# Patient Record
Sex: FEMALE | Race: BLACK OR AFRICAN AMERICAN | Employment: FULL TIME | URBAN - METROPOLITAN AREA
[De-identification: names, ages, dates, MRNs, and addresses within clinical notes are randomized per-mention and may not be internally consistent; named-entity substitution may affect disease eponyms.]

---

## 2020-08-19 ENCOUNTER — HOSPITAL ENCOUNTER (EMERGENCY)
Age: 45
Discharge: HOME | End: 2020-08-19
Attending: EMERGENCY MEDICINE
Payer: COMMERCIAL

## 2020-08-19 VITALS
SYSTOLIC BLOOD PRESSURE: 108 MMHG | TEMPERATURE: 97.8 F | RESPIRATION RATE: 14 BRPM | OXYGEN SATURATION: 100 % | WEIGHT: 275.8 LBS | HEART RATE: 94 BPM | DIASTOLIC BLOOD PRESSURE: 77 MMHG

## 2020-08-19 LAB
A/G RATIO: 1.3 (ref 1.1–2.2)
ALBUMIN SERPL-MCNC: 4.9 G/DL (ref 3.4–5)
ALP BLD-CCNC: 201 U/L (ref 40–129)
ALT SERPL-CCNC: 119 U/L (ref 10–40)
ANION GAP SERPL CALCULATED.3IONS-SCNC: 17 MMOL/L (ref 3–16)
AST SERPL-CCNC: 95 U/L (ref 15–37)
BACTERIA: ABNORMAL /HPF
BASOPHILS ABSOLUTE: 0.1 K/UL (ref 0–0.2)
BASOPHILS RELATIVE PERCENT: 0.5 %
BILIRUB SERPL-MCNC: 0.5 MG/DL (ref 0–1)
BILIRUBIN URINE: NEGATIVE
BLOOD, URINE: ABNORMAL
BUN BLDV-MCNC: 14 MG/DL (ref 7–20)
CALCIUM SERPL-MCNC: 10 MG/DL (ref 8.3–10.6)
CHLORIDE BLD-SCNC: 93 MMOL/L (ref 99–110)
CLARITY: CLEAR
CO2: 26 MMOL/L (ref 21–32)
COLOR: ABNORMAL
CREAT SERPL-MCNC: 0.9 MG/DL (ref 0.6–1.1)
EOSINOPHILS ABSOLUTE: 0.2 K/UL (ref 0–0.6)
EOSINOPHILS RELATIVE PERCENT: 1.4 %
EPITHELIAL CELLS, UA: ABNORMAL /HPF (ref 0–5)
GFR AFRICAN AMERICAN: >60
GFR NON-AFRICAN AMERICAN: >60
GLOBULIN: 3.8 G/DL
GLUCOSE BLD-MCNC: 412 MG/DL (ref 70–99)
GLUCOSE BLD-MCNC: 413 MG/DL
GLUCOSE BLD-MCNC: 413 MG/DL (ref 70–99)
GLUCOSE BLD-MCNC: 466 MG/DL (ref 70–99)
GLUCOSE BLD-MCNC: 636 MG/DL (ref 70–99)
GLUCOSE URINE: >=1000 MG/DL
HCG(URINE) PREGNANCY TEST: NEGATIVE
HCT VFR BLD CALC: 38.1 % (ref 36–48)
HEMOGLOBIN: 12.3 G/DL (ref 12–16)
KETONES, URINE: NEGATIVE MG/DL
LEUKOCYTE ESTERASE, URINE: NEGATIVE
LYMPHOCYTES ABSOLUTE: 2.9 K/UL (ref 1–5.1)
LYMPHOCYTES RELATIVE PERCENT: 26.6 %
MCH RBC QN AUTO: 23.4 PG (ref 26–34)
MCHC RBC AUTO-ENTMCNC: 32.3 G/DL (ref 31–36)
MCV RBC AUTO: 72.5 FL (ref 80–100)
MICROSCOPIC EXAMINATION: YES
MONOCYTES ABSOLUTE: 0.5 K/UL (ref 0–1.3)
MONOCYTES RELATIVE PERCENT: 4.4 %
NEUTROPHILS ABSOLUTE: 7.2 K/UL (ref 1.7–7.7)
NEUTROPHILS RELATIVE PERCENT: 67.1 %
NITRITE, URINE: NEGATIVE
PDW BLD-RTO: 18.3 % (ref 12.4–15.4)
PERFORMED ON: ABNORMAL
PH UA: 6 (ref 5–8)
PLATELET # BLD: 344 K/UL (ref 135–450)
PMV BLD AUTO: 9.9 FL (ref 5–10.5)
POTASSIUM REFLEX MAGNESIUM: 4.4 MMOL/L (ref 3.5–5.1)
PROTEIN UA: NEGATIVE MG/DL
RBC # BLD: 5.26 M/UL (ref 4–5.2)
RBC UA: ABNORMAL /HPF (ref 0–4)
SODIUM BLD-SCNC: 136 MMOL/L (ref 136–145)
SPECIFIC GRAVITY UA: <=1.005 (ref 1–1.03)
TOTAL PROTEIN: 8.7 G/DL (ref 6.4–8.2)
URINE TYPE: ABNORMAL
UROBILINOGEN, URINE: 0.2 E.U./DL
WBC # BLD: 10.7 K/UL (ref 4–11)
WBC UA: ABNORMAL /HPF (ref 0–5)
YEAST: PRESENT /HPF

## 2020-08-19 PROCEDURE — 6370000000 HC RX 637 (ALT 250 FOR IP): Performed by: EMERGENCY MEDICINE

## 2020-08-19 PROCEDURE — 99284 EMERGENCY DEPT VISIT MOD MDM: CPT

## 2020-08-19 PROCEDURE — 80053 COMPREHEN METABOLIC PANEL: CPT

## 2020-08-19 PROCEDURE — 6370000000 HC RX 637 (ALT 250 FOR IP)

## 2020-08-19 PROCEDURE — 96372 THER/PROPH/DIAG INJ SC/IM: CPT

## 2020-08-19 PROCEDURE — 85025 COMPLETE CBC W/AUTO DIFF WBC: CPT

## 2020-08-19 PROCEDURE — 84703 CHORIONIC GONADOTROPIN ASSAY: CPT

## 2020-08-19 PROCEDURE — 81001 URINALYSIS AUTO W/SCOPE: CPT

## 2020-08-19 RX ORDER — BLOOD-GLUCOSE METER
1 KIT MISCELLANEOUS DAILY
Qty: 1 KIT | Refills: 0 | Status: SHIPPED | OUTPATIENT
Start: 2020-08-19 | End: 2021-08-10 | Stop reason: SDUPTHER

## 2020-08-19 RX ORDER — GLUCOSAMINE HCL/CHONDROITIN SU 500-400 MG
CAPSULE ORAL
Qty: 90 STRIP | Refills: 0 | Status: SHIPPED | OUTPATIENT
Start: 2020-08-19

## 2020-08-19 RX ADMIN — INSULIN HUMAN 13 UNITS: 100 INJECTION, SOLUTION PARENTERAL at 16:29

## 2020-08-19 RX ADMIN — INSULIN HUMAN 13 UNITS: 100 INJECTION, SOLUTION PARENTERAL at 15:12

## 2020-08-19 RX ADMIN — INSULIN HUMAN 13 UNITS: 100 INJECTION, SOLUTION PARENTERAL at 17:48

## 2020-08-19 NOTE — ED PROVIDER NOTES
EMERGENCY DEPARTMENT PHYSICIAN DOCUMENTATION      CHIEF COMPLAINT  dehydration    Patient information was obtained from patient. History/Exam limitations: none. HISTORY OF PRESENT ILLNESS  Gage Salmeron is a 45 y.o. female with complaint of \"dehydration. \"    States 1 month history of polyuria and polydipsia. Mouth feels dry like metal.  She is drinking over a gallon of water a day and still feels thirsty. She feels overall weak and lightheaded and low energy. She does have a history of heavy menstrual periods. No chest pain or shortness of breath. REVIEW OF SYSTEMS  A full 10 point Review of Systems was performed and is negative aside from pertinent positives mentioned in HPI    ALLERGIES:  No Known Allergies    PAST HISTORY  No past medical history on file. No family history on file. No current facility-administered medications on file prior to encounter.       Current Outpatient Medications on File Prior to Encounter   Medication Sig Dispense Refill    ibuprofen (ADVIL;MOTRIN) 800 MG tablet Take 1 tablet by mouth every 8 hours as needed for Pain or Fever 20 tablet 0       Social History     Tobacco Use    Smoking status: Never Smoker   Substance Use Topics    Alcohol use: No    Drug use: No         EXAM:   Presentation Vital Signs: /71   Pulse 109   Temp 97.8 °F (36.6 °C) (Oral)   Resp 17   Wt 275 lb 12.8 oz (125.1 kg)   SpO2 97%     General: Well nourished, no acute distress  Head: No traumatic injury  ENT: mm dry, no facial asymmetry, no nasal discharge  Eyes: EOM  Neck: No tracheal deviation or stridor  Lungs: Respirations clear to ausculation, no respiratory distress  Cardiac:borderline tachy, no murmurs or gallops  Abdomen: Soft, mild ruq tender  Skin: no pallor, erythema, lesions or other abnormalities on exposed skin of face and arms  Extremities: Normal ROM of bilateral upper extremities at shoulders, elbows, wrists; normal ROM of bilateral LE at hips and

## 2020-08-19 NOTE — ED NOTES
Patient working on 3rd large cup of water Dr James Ang bedside.      Diego Ingram, SALBADOR  08/19/20 0503

## 2020-08-19 NOTE — ED NOTES
Pt verbalized understanding of discharge instructions and the need for keeping a record of FSBS for her [facility]     Daniella Beltran RN  08/19/20 1921

## 2020-08-19 NOTE — ED NOTES
Patient to ed with complaints of feeling lightheaded, dry mouth and increased urination which has been present for over 1 month, patient reports her blood sugar was high at work.      Shari Juan RN  08/19/20 0000

## 2020-08-20 ENCOUNTER — OFFICE VISIT (OUTPATIENT)
Dept: FAMILY MEDICINE CLINIC | Age: 45
End: 2020-08-20
Payer: COMMERCIAL

## 2020-08-20 ENCOUNTER — HOSPITAL ENCOUNTER (OUTPATIENT)
Age: 45
Setting detail: OBSERVATION
Discharge: HOME | End: 2020-08-21
Attending: EMERGENCY MEDICINE | Admitting: INTERNAL MEDICINE
Payer: COMMERCIAL

## 2020-08-20 VITALS
TEMPERATURE: 97.7 F | WEIGHT: 280 LBS | HEART RATE: 107 BPM | DIASTOLIC BLOOD PRESSURE: 70 MMHG | HEIGHT: 68 IN | BODY MASS INDEX: 42.44 KG/M2 | OXYGEN SATURATION: 97 % | SYSTOLIC BLOOD PRESSURE: 102 MMHG

## 2020-08-20 PROBLEM — E11.9 DIABETES MELLITUS (HCC): Status: ACTIVE | Noted: 2020-08-20

## 2020-08-20 PROBLEM — E11.9 NEW ONSET TYPE 2 DIABETES MELLITUS (HCC): Status: ACTIVE | Noted: 2020-08-20

## 2020-08-20 LAB
ANION GAP SERPL CALCULATED.3IONS-SCNC: 11 MMOL/L (ref 3–16)
ANION GAP SERPL CALCULATED.3IONS-SCNC: 16 MMOL/L (ref 3–16)
BASE EXCESS VENOUS: -1.2 MMOL/L (ref -2–3)
BASOPHILS ABSOLUTE: 0.1 K/UL (ref 0–0.2)
BASOPHILS RELATIVE PERCENT: 0.9 %
BILIRUBIN URINE: NEGATIVE
BLOOD, URINE: NEGATIVE
BUN BLDV-MCNC: 12 MG/DL (ref 7–20)
BUN BLDV-MCNC: 13 MG/DL (ref 7–20)
CALCIUM SERPL-MCNC: 8.9 MG/DL (ref 8.3–10.6)
CALCIUM SERPL-MCNC: 9 MG/DL (ref 8.3–10.6)
CARBOXYHEMOGLOBIN: 1.8 % (ref 0–1.5)
CHLORIDE BLD-SCNC: 93 MMOL/L (ref 99–110)
CHLORIDE BLD-SCNC: 96 MMOL/L (ref 99–110)
CHOLESTEROL, TOTAL: 184 MG/DL (ref 0–199)
CLARITY: CLEAR
CO2: 21 MMOL/L (ref 21–32)
CO2: 23 MMOL/L (ref 21–32)
COLOR: YELLOW
CREAT SERPL-MCNC: 0.9 MG/DL (ref 0.6–1.1)
CREAT SERPL-MCNC: 1 MG/DL (ref 0.6–1.1)
EOSINOPHILS ABSOLUTE: 0.2 K/UL (ref 0–0.6)
EOSINOPHILS RELATIVE PERCENT: 1.4 %
GFR AFRICAN AMERICAN: >60
GFR AFRICAN AMERICAN: >60
GFR NON-AFRICAN AMERICAN: >60
GFR NON-AFRICAN AMERICAN: >60
GLUCOSE BLD-MCNC: 354 MG/DL (ref 70–99)
GLUCOSE BLD-MCNC: 369 MG/DL (ref 70–99)
GLUCOSE BLD-MCNC: 374 MG/DL (ref 70–99)
GLUCOSE BLD-MCNC: 423 MG/DL (ref 70–99)
GLUCOSE BLD-MCNC: 433 MG/DL (ref 70–99)
GLUCOSE BLD-MCNC: 465 MG/DL (ref 70–99)
GLUCOSE URINE: >=1000 MG/DL
HCG(URINE) PREGNANCY TEST: NEGATIVE
HCO3 VENOUS: 23.9 MMOL/L (ref 24–28)
HCT VFR BLD CALC: 36.3 % (ref 36–48)
HDLC SERPL-MCNC: 39 MG/DL (ref 40–60)
HEMOGLOBIN, VEN, REDUCED: 36 %
HEMOGLOBIN: 11.8 G/DL (ref 12–16)
KETONES, URINE: >=80 MG/DL
LDL CHOLESTEROL CALCULATED: ABNORMAL MG/DL
LDL CHOLESTEROL DIRECT: 91 MG/DL
LEUKOCYTE ESTERASE, URINE: NEGATIVE
LYMPHOCYTES ABSOLUTE: 2.8 K/UL (ref 1–5.1)
LYMPHOCYTES RELATIVE PERCENT: 23.8 %
MCH RBC QN AUTO: 23.6 PG (ref 26–34)
MCHC RBC AUTO-ENTMCNC: 32.6 G/DL (ref 31–36)
MCV RBC AUTO: 72.3 FL (ref 80–100)
METHEMOGLOBIN VENOUS: 0.8 % (ref 0–1.5)
MICROSCOPIC EXAMINATION: ABNORMAL
MONOCYTES ABSOLUTE: 0.5 K/UL (ref 0–1.3)
MONOCYTES RELATIVE PERCENT: 4.3 %
NEUTROPHILS ABSOLUTE: 8.3 K/UL (ref 1.7–7.7)
NEUTROPHILS RELATIVE PERCENT: 69.6 %
NITRITE, URINE: NEGATIVE
O2 SAT, VEN: 63 %
PCO2, VEN: 44.4 MMHG (ref 41–51)
PDW BLD-RTO: 18 % (ref 12.4–15.4)
PERFORMED ON: ABNORMAL
PH UA: 6 (ref 5–8)
PH VENOUS: 7.35 (ref 7.35–7.45)
PLATELET # BLD: 294 K/UL (ref 135–450)
PMV BLD AUTO: 9.9 FL (ref 5–10.5)
PO2, VEN: 39.6 MMHG (ref 25–40)
POTASSIUM REFLEX MAGNESIUM: 4.3 MMOL/L (ref 3.5–5.1)
POTASSIUM REFLEX MAGNESIUM: 5.7 MMOL/L (ref 3.5–5.1)
POTASSIUM SERPL-SCNC: 5.4 MMOL/L (ref 3.5–5.1)
PROTEIN UA: NEGATIVE MG/DL
RBC # BLD: 5.02 M/UL (ref 4–5.2)
SODIUM BLD-SCNC: 130 MMOL/L (ref 136–145)
SODIUM BLD-SCNC: 130 MMOL/L (ref 136–145)
SPECIFIC GRAVITY UA: 1.01 (ref 1–1.03)
TCO2 CALC VENOUS: 25 MMOL/L
TRIGL SERPL-MCNC: 401 MG/DL (ref 0–150)
URINE TYPE: ABNORMAL
UROBILINOGEN, URINE: 0.2 E.U./DL
VLDLC SERPL CALC-MCNC: ABNORMAL MG/DL
WBC # BLD: 11.9 K/UL (ref 4–11)

## 2020-08-20 PROCEDURE — 99204 OFFICE O/P NEW MOD 45 MIN: CPT | Performed by: NURSE PRACTITIONER

## 2020-08-20 PROCEDURE — 6360000002 HC RX W HCPCS: Performed by: STUDENT IN AN ORGANIZED HEALTH CARE EDUCATION/TRAINING PROGRAM

## 2020-08-20 PROCEDURE — G0378 HOSPITAL OBSERVATION PER HR: HCPCS

## 2020-08-20 PROCEDURE — 99285 EMERGENCY DEPT VISIT HI MDM: CPT

## 2020-08-20 PROCEDURE — 96372 THER/PROPH/DIAG INJ SC/IM: CPT

## 2020-08-20 PROCEDURE — 2580000003 HC RX 258: Performed by: STUDENT IN AN ORGANIZED HEALTH CARE EDUCATION/TRAINING PROGRAM

## 2020-08-20 PROCEDURE — 85025 COMPLETE CBC W/AUTO DIFF WBC: CPT

## 2020-08-20 PROCEDURE — 80048 BASIC METABOLIC PNL TOTAL CA: CPT

## 2020-08-20 PROCEDURE — 80061 LIPID PANEL: CPT

## 2020-08-20 PROCEDURE — 6370000000 HC RX 637 (ALT 250 FOR IP): Performed by: STUDENT IN AN ORGANIZED HEALTH CARE EDUCATION/TRAINING PROGRAM

## 2020-08-20 PROCEDURE — 2580000003 HC RX 258: Performed by: EMERGENCY MEDICINE

## 2020-08-20 PROCEDURE — 84703 CHORIONIC GONADOTROPIN ASSAY: CPT

## 2020-08-20 PROCEDURE — 36415 COLL VENOUS BLD VENIPUNCTURE: CPT

## 2020-08-20 PROCEDURE — 81003 URINALYSIS AUTO W/O SCOPE: CPT

## 2020-08-20 PROCEDURE — 83721 ASSAY OF BLOOD LIPOPROTEIN: CPT

## 2020-08-20 PROCEDURE — 82803 BLOOD GASES ANY COMBINATION: CPT

## 2020-08-20 PROCEDURE — 84132 ASSAY OF SERUM POTASSIUM: CPT

## 2020-08-20 PROCEDURE — 1111F DSCHRG MED/CURRENT MED MERGE: CPT | Performed by: NURSE PRACTITIONER

## 2020-08-20 RX ORDER — SODIUM CHLORIDE, SODIUM LACTATE, POTASSIUM CHLORIDE, CALCIUM CHLORIDE 600; 310; 30; 20 MG/100ML; MG/100ML; MG/100ML; MG/100ML
INJECTION, SOLUTION INTRAVENOUS CONTINUOUS
Status: DISCONTINUED | OUTPATIENT
Start: 2020-08-20 | End: 2020-08-20 | Stop reason: SDUPTHER

## 2020-08-20 RX ORDER — SODIUM CHLORIDE 0.9 % (FLUSH) 0.9 %
10 SYRINGE (ML) INJECTION PRN
Status: DISCONTINUED | OUTPATIENT
Start: 2020-08-20 | End: 2020-08-21 | Stop reason: HOSPADM

## 2020-08-20 RX ORDER — DEXTROSE MONOHYDRATE 50 MG/ML
100 INJECTION, SOLUTION INTRAVENOUS PRN
Status: DISCONTINUED | OUTPATIENT
Start: 2020-08-20 | End: 2020-08-20 | Stop reason: SDUPTHER

## 2020-08-20 RX ORDER — INSULIN LISPRO 100 [IU]/ML
8 INJECTION, SOLUTION INTRAVENOUS; SUBCUTANEOUS ONCE
Status: COMPLETED | OUTPATIENT
Start: 2020-08-20 | End: 2020-08-20

## 2020-08-20 RX ORDER — NICOTINE POLACRILEX 4 MG
15 LOZENGE BUCCAL PRN
Status: DISCONTINUED | OUTPATIENT
Start: 2020-08-20 | End: 2020-08-20 | Stop reason: SDUPTHER

## 2020-08-20 RX ORDER — DEXTROSE MONOHYDRATE 50 MG/ML
100 INJECTION, SOLUTION INTRAVENOUS PRN
Status: DISCONTINUED | OUTPATIENT
Start: 2020-08-20 | End: 2020-08-21 | Stop reason: HOSPADM

## 2020-08-20 RX ORDER — ACETAMINOPHEN 650 MG/1
650 SUPPOSITORY RECTAL EVERY 6 HOURS PRN
Status: DISCONTINUED | OUTPATIENT
Start: 2020-08-20 | End: 2020-08-21 | Stop reason: HOSPADM

## 2020-08-20 RX ORDER — INSULIN LISPRO 100 [IU]/ML
0-3 INJECTION, SOLUTION INTRAVENOUS; SUBCUTANEOUS NIGHTLY
Status: DISCONTINUED | OUTPATIENT
Start: 2020-08-20 | End: 2020-08-21

## 2020-08-20 RX ORDER — INSULIN GLARGINE 100 [IU]/ML
30 INJECTION, SOLUTION SUBCUTANEOUS NIGHTLY
Qty: 15 PEN | Refills: 1 | Status: CANCELLED | OUTPATIENT
Start: 2020-08-20

## 2020-08-20 RX ORDER — SODIUM CHLORIDE, SODIUM LACTATE, POTASSIUM CHLORIDE, CALCIUM CHLORIDE 600; 310; 30; 20 MG/100ML; MG/100ML; MG/100ML; MG/100ML
INJECTION, SOLUTION INTRAVENOUS CONTINUOUS
Status: DISCONTINUED | OUTPATIENT
Start: 2020-08-20 | End: 2020-08-21 | Stop reason: HOSPADM

## 2020-08-20 RX ORDER — DEXTROSE MONOHYDRATE 25 G/50ML
12.5 INJECTION, SOLUTION INTRAVENOUS PRN
Status: DISCONTINUED | OUTPATIENT
Start: 2020-08-20 | End: 2020-08-21 | Stop reason: HOSPADM

## 2020-08-20 RX ORDER — ACETAMINOPHEN 325 MG/1
650 TABLET ORAL EVERY 6 HOURS PRN
Status: DISCONTINUED | OUTPATIENT
Start: 2020-08-20 | End: 2020-08-21 | Stop reason: HOSPADM

## 2020-08-20 RX ORDER — INSULIN LISPRO 100 [IU]/ML
8 INJECTION, SOLUTION INTRAVENOUS; SUBCUTANEOUS
Qty: 3 PEN | Refills: 2 | Status: CANCELLED | OUTPATIENT
Start: 2020-08-20

## 2020-08-20 RX ORDER — POLYETHYLENE GLYCOL 3350 17 G/17G
17 POWDER, FOR SOLUTION ORAL DAILY PRN
Status: DISCONTINUED | OUTPATIENT
Start: 2020-08-20 | End: 2020-08-21 | Stop reason: HOSPADM

## 2020-08-20 RX ORDER — INSULIN LISPRO 100 [IU]/ML
0-6 INJECTION, SOLUTION INTRAVENOUS; SUBCUTANEOUS
Status: DISCONTINUED | OUTPATIENT
Start: 2020-08-20 | End: 2020-08-21

## 2020-08-20 RX ORDER — ONDANSETRON 2 MG/ML
4 INJECTION INTRAMUSCULAR; INTRAVENOUS EVERY 6 HOURS PRN
Status: DISCONTINUED | OUTPATIENT
Start: 2020-08-20 | End: 2020-08-21 | Stop reason: HOSPADM

## 2020-08-20 RX ORDER — SODIUM CHLORIDE, SODIUM LACTATE, POTASSIUM CHLORIDE, AND CALCIUM CHLORIDE .6; .31; .03; .02 G/100ML; G/100ML; G/100ML; G/100ML
1000 INJECTION, SOLUTION INTRAVENOUS ONCE
Status: COMPLETED | OUTPATIENT
Start: 2020-08-20 | End: 2020-08-20

## 2020-08-20 RX ORDER — SODIUM CHLORIDE 0.9 % (FLUSH) 0.9 %
10 SYRINGE (ML) INJECTION EVERY 12 HOURS SCHEDULED
Status: DISCONTINUED | OUTPATIENT
Start: 2020-08-20 | End: 2020-08-21 | Stop reason: HOSPADM

## 2020-08-20 RX ORDER — PROMETHAZINE HYDROCHLORIDE 12.5 MG/1
12.5 TABLET ORAL EVERY 6 HOURS PRN
Status: DISCONTINUED | OUTPATIENT
Start: 2020-08-20 | End: 2020-08-21 | Stop reason: HOSPADM

## 2020-08-20 RX ORDER — DEXTROSE MONOHYDRATE 25 G/50ML
12.5 INJECTION, SOLUTION INTRAVENOUS PRN
Status: DISCONTINUED | OUTPATIENT
Start: 2020-08-20 | End: 2020-08-20 | Stop reason: SDUPTHER

## 2020-08-20 RX ORDER — NICOTINE POLACRILEX 4 MG
15 LOZENGE BUCCAL PRN
Status: DISCONTINUED | OUTPATIENT
Start: 2020-08-20 | End: 2020-08-21 | Stop reason: HOSPADM

## 2020-08-20 RX ADMIN — INSULIN GLARGINE 5 UNITS: 100 INJECTION, SOLUTION SUBCUTANEOUS at 21:15

## 2020-08-20 RX ADMIN — SODIUM CHLORIDE, POTASSIUM CHLORIDE, SODIUM LACTATE AND CALCIUM CHLORIDE: 600; 310; 30; 20 INJECTION, SOLUTION INTRAVENOUS at 18:38

## 2020-08-20 RX ADMIN — ENOXAPARIN SODIUM 40 MG: 40 INJECTION SUBCUTANEOUS at 19:11

## 2020-08-20 RX ADMIN — INSULIN HUMAN 5 UNITS: 100 INJECTION, SOLUTION PARENTERAL at 19:11

## 2020-08-20 RX ADMIN — SODIUM CHLORIDE, POTASSIUM CHLORIDE, SODIUM LACTATE AND CALCIUM CHLORIDE 1000 ML: 600; 310; 30; 20 INJECTION, SOLUTION INTRAVENOUS at 14:52

## 2020-08-20 RX ADMIN — INSULIN LISPRO 8 UNITS: 100 INJECTION, SOLUTION INTRAVENOUS; SUBCUTANEOUS at 21:15

## 2020-08-20 SDOH — ECONOMIC STABILITY: INCOME INSECURITY: HOW HARD IS IT FOR YOU TO PAY FOR THE VERY BASICS LIKE FOOD, HOUSING, MEDICAL CARE, AND HEATING?: NOT HARD AT ALL

## 2020-08-20 SDOH — ECONOMIC STABILITY: TRANSPORTATION INSECURITY
IN THE PAST 12 MONTHS, HAS LACK OF TRANSPORTATION KEPT YOU FROM MEETINGS, WORK, OR FROM GETTING THINGS NEEDED FOR DAILY LIVING?: NO

## 2020-08-20 SDOH — ECONOMIC STABILITY: FOOD INSECURITY: WITHIN THE PAST 12 MONTHS, YOU WORRIED THAT YOUR FOOD WOULD RUN OUT BEFORE YOU GOT MONEY TO BUY MORE.: NEVER TRUE

## 2020-08-20 SDOH — ECONOMIC STABILITY: FOOD INSECURITY: WITHIN THE PAST 12 MONTHS, THE FOOD YOU BOUGHT JUST DIDN'T LAST AND YOU DIDN'T HAVE MONEY TO GET MORE.: NEVER TRUE

## 2020-08-20 SDOH — ECONOMIC STABILITY: TRANSPORTATION INSECURITY
IN THE PAST 12 MONTHS, HAS THE LACK OF TRANSPORTATION KEPT YOU FROM MEDICAL APPOINTMENTS OR FROM GETTING MEDICATIONS?: NO

## 2020-08-20 ASSESSMENT — PAIN DESCRIPTION - FREQUENCY: FREQUENCY: INTERMITTENT

## 2020-08-20 ASSESSMENT — ENCOUNTER SYMPTOMS
RESPIRATORY NEGATIVE: 1
VOMITING: 0
NAUSEA: 1
FACIAL SWELLING: 0
DIARRHEA: 1
COLOR CHANGE: 0
SHORTNESS OF BREATH: 0
ABDOMINAL DISTENTION: 0
NAUSEA: 1
COUGH: 0

## 2020-08-20 ASSESSMENT — PAIN - FUNCTIONAL ASSESSMENT: PAIN_FUNCTIONAL_ASSESSMENT: ACTIVITIES ARE NOT PREVENTED

## 2020-08-20 ASSESSMENT — PAIN DESCRIPTION - DESCRIPTORS: DESCRIPTORS: ACHING

## 2020-08-20 ASSESSMENT — PAIN SCALES - GENERAL
PAINLEVEL_OUTOF10: 0
PAINLEVEL_OUTOF10: 5

## 2020-08-20 ASSESSMENT — PAIN DESCRIPTION - ONSET: ONSET: ON-GOING

## 2020-08-20 ASSESSMENT — PATIENT HEALTH QUESTIONNAIRE - PHQ9
SUM OF ALL RESPONSES TO PHQ9 QUESTIONS 1 & 2: 0
2. FEELING DOWN, DEPRESSED OR HOPELESS: 0
1. LITTLE INTEREST OR PLEASURE IN DOING THINGS: 0
SUM OF ALL RESPONSES TO PHQ QUESTIONS 1-9: 0
SUM OF ALL RESPONSES TO PHQ QUESTIONS 1-9: 0

## 2020-08-20 ASSESSMENT — PAIN DESCRIPTION - LOCATION: LOCATION: HEAD

## 2020-08-20 ASSESSMENT — PAIN DESCRIPTION - PAIN TYPE: TYPE: ACUTE PAIN

## 2020-08-20 ASSESSMENT — PAIN DESCRIPTION - PROGRESSION: CLINICAL_PROGRESSION: GRADUALLY WORSENING

## 2020-08-20 ASSESSMENT — PAIN DESCRIPTION - ORIENTATION: ORIENTATION: MID

## 2020-08-20 NOTE — PROGRESS NOTES
2020    Adelaida Garcia (:  1975) is a 45 y.o. female, here to establish care or for evaluation of the following medical concerns:    Patient is a newly diagnosed type II diabetic. Seen in the emergency room yesterday with a blood sugar of 686. Apparently she refused to get IV fluids and was discharged on metformin. Blood sugar in office today was 436 however patient is feeling very lightheaded, dizzy, nauseous. Review of Systems   Constitutional: Positive for activity change, appetite change and fatigue. Eyes: Positive for visual disturbance. Respiratory: Negative. Cardiovascular: Negative. Gastrointestinal: Positive for nausea. Genitourinary: Positive for frequency. Musculoskeletal: Negative. Neurological: Positive for dizziness and light-headedness. Psychiatric/Behavioral: Negative. Current Outpatient Medications   Medication Sig Dispense Refill    metFORMIN (GLUCOPHAGE) 500 MG tablet Take 1 tablet by mouth 2 times daily (with meals) 60 tablet 1    glucose monitoring kit (FREESTYLE) monitoring kit 1 kit by Does not apply route daily 1 kit 0    blood glucose monitor strips Test 3 times a day & as needed for symptoms of irregular blood glucose. Dispense sufficient amount for indicated testing frequency plus additional to accommodate PRN testing needs. 90 strip 0    ibuprofen (ADVIL;MOTRIN) 800 MG tablet Take 1 tablet by mouth every 8 hours as needed for Pain or Fever 20 tablet 0     No current facility-administered medications for this visit. No Known Allergies    History reviewed. No pertinent past medical history.     Past Surgical History:   Procedure Laterality Date    APPENDECTOMY         Social History     Socioeconomic History    Marital status: Single     Spouse name: Not on file    Number of children: Not on file    Years of education: Not on file    Highest education level: Not on file   Occupational History    Not on file   Social Needs  Financial resource strain: Not hard at all   [employer] insecurity     Worry: Never true     Inability: Never true    Transportation needs     Medical: No     Non-medical: No   Tobacco Use    Smoking status: Never Smoker    Smokeless tobacco: Never Used   Substance and Sexual Activity    Alcohol use: No    Drug use: No    Sexual activity: Yes     Partners: Male   Lifestyle    Physical activity     Days per week: Not on file     Minutes per session: Not on file    Stress: Not on file   Relationships    Social connections     Talks on phone: Not on file     Gets together: Not on file     Attends Jehovah's witness service: Not on file     Active member of club or organization: Not on file     Attends meetings of clubs or organizations: Not on file     Relationship status: Not on file    Intimate partner violence     Fear of current or ex partner: Not on file     Emotionally abused: Not on file     Physically abused: Not on file     Forced sexual activity: Not on file   Other Topics Concern    Not on file   Social History Narrative    Not on file        Family History   Problem Relation Age of Onset    Asthma Mother     Diabetes Mother     Heart Disease Mother        Vitals:    08/20/20 1253   BP: 102/70   Pulse: 107   Temp: 97.7 °F (36.5 °C)   SpO2: 97%       Estimated body mass index is 42.83 kg/m² as calculated from the following:    Height as of this encounter: 5' 7.8\" (1.722 m). Weight as of this encounter: 280 lb (127 kg). Physical Exam  Constitutional:       Appearance: She is well-developed. HENT:      Head: Normocephalic. Eyes:      Pupils: Pupils are equal, round, and reactive to light. Neck:      Musculoskeletal: Normal range of motion. Cardiovascular:      Rate and Rhythm: Normal rate. Pulmonary:      Effort: Pulmonary effort is normal.   Musculoskeletal: Normal range of motion. Skin:     General: Skin is warm and dry.    Neurological:      Mental Status: She is alert and oriented to

## 2020-08-20 NOTE — PLAN OF CARE
4 Eyes Admission Assessment     I agree as the admission nurse that 2 RN's have performed a thorough Head to Toe Skin Assessment on the patient. ALL assessment sites listed below have been assessed on admission. Areas assessed by both nurses: Radha Kobs  [x]   Head, Face, and Ears   [x]   Shoulders, Back, and Chest  [x]   Arms, Elbows, and Hands   [x]   Coccyx, Sacrum, and Ischum  [x]   Legs, Feet, and Heels        Does the Patient have Skin Breakdown?   No         Rauliot Prevention initiated:  NA   Wound Care Orders initiated:  No      [facility] nurse consulted for Pressure Injury (Stage 3,4, Unstageable, DTI, NWPT, and Complex wounds):  No      Nurse 1 eSignature: Electronically signed by Sadia Suarez RN on 8/20/20 at 7:03 PM EDT    **SHARE this note so that the co-signing nurse is able to place an eSignature**    Nurse 2 eSignature: Electronically signed by Kathy Durand RN on 8/20/20 at 7:45 PM EDT

## 2020-08-20 NOTE — PROGRESS NOTES
Patient arrived to floor at 31 [phone] 62. Patient A&O x4, vitals stable, blood glucose 354, not on telemetry, no shortness of breath, no chest pain. Patient's bed locked in lowest position. Lactated ringers infusing in right AC at 150 ml/hr. Patient's questions answered and needs met.

## 2020-08-20 NOTE — H&P
Internal Medicine  PGY 1  History & Physical      CC blurry vision, nausea    History Obtained From:  Patient, mother at bedside    HISTORY OF PRESENT ILLNESS:   Deysi Banuelos is a 45 y.o. female with no significant PMHx who presents with nausea, blurry vision, dizziness, polyuria, and polydipsia which began about a month ago . She presented to ED yesterday with same symptoms and was found to be hyperglycemic into the 600 range, treated with fluids, 39 units of insulin, and advised to be admitted, but refused admission and followed up today with family medicine. She was prescribed 500mg metformin BID. ED labs significant for glucose 436, urine glucose >1000, ketonuria >80, potassium 5.7, no anion gap.     Denies recent falls, chest pain, palpitations, fevers, or shortness of breath. Past Medical History:    · History reviewed. No pertinent past medical history. Past Surgical History:        Procedure Laterality Date    APPENDECTOMY     ·     Medications Priorto Admission:    · Not in a hospital admission. Allergies:  Patient has no known allergies. Social History:   · TOBACCO:   reports that she has never smoked. She has never used smokeless tobacco.  · ETOH:   reports no history of alcohol use. · Patient currently lives with family   ·   Family History:       Problem Relation Age of Onset    Asthma Mother     Diabetes Mother     Heart Disease Mother    ·     Review of Systems   Constitutional: Positive for fatigue. Negative for diaphoresis and fever. HENT: Negative for congestion and facial swelling. Eyes: Positive for visual disturbance. Respiratory: Negative for cough and shortness of breath. Cardiovascular: Negative for chest pain and leg swelling. Gastrointestinal: Positive for diarrhea and nausea. Negative for abdominal distention and vomiting. Endocrine: Positive for polydipsia and polyuria. Genitourinary: Negative for dysuria and flank pain.    Musculoskeletal: Negative last 72 hours. INR: No results for input(s): INR in the last 72 hours. U/A:  Recent Labs     08/19/20  1352 08/20/20  1442   COLORU Straw Yellow   PHUR 6.0 6.0   WBCUA None seen  --    RBCUA 5-10*  --    YEAST Present*  --    BACTERIA Rare*  --    CLARITYU Clear Clear   SPECGRAV <=1.005 1.010   LEUKOCYTESUR Negative Negative   UROBILINOGEN 0.2 0.2   BILIRUBINUR Negative Negative   BLOODU MODERATE* Negative   GLUCOSEU >=1000* >=1000*       No orders to display           ASSESSMENT AND PLAN:  Ms. Sharon Fiore is a 44 yo woman with no significant PMHx presenting with polyuria, polydipsia, diplopia, and nausea, found to be hyperglycemic in 400's with ketonuria. Diabetes Type 2, new diagnosis; H1ac at 9  Hyperglycemia  Hyperkalemia  No anion gap, glucosuria >1000, urine ketones >80  5 Lantus and LDSS  IV fluids  microalbumin and urine Cr  Glucose checks qhACS  Hypoglycemia protocols  Diabetes education    Hyperkalemia  -insulin  -follow up RFP and Mg, replete as necessary    Leukocytosis  -likely reactive; no other signs of infection  -will continue to monitor    Obesity-  Complicates all aspects of care; Will discuss health risks of obesity and  lifestyle modifications   -lipid panel      Will discuss with attending physician Dr. Vasu Hernandez Status:Full code  FEN: carb control  PPX: Lovenox  DISPO: Daryl Simon MD  8/20/2020,  5:41 PM    ________________________________________________________________________     Attending Attestation  Patient seen and examined independently but in conjunction with resident physician. We discussed the resident's workup, evaluation, management and diagnosis. I agree with the residents care plan except as noted.      My assessment reveals morbidly obese AAF presenting w/ new onset DM. Admitted as there is concern that pt may go into DKA w/o acute mgmt. Started on metformin yd. As above.      PHYSICAL EXAM PERFORMED:     /80   Pulse 100   Temp 98 °F (36.7 °C)

## 2020-08-20 NOTE — ED PROVIDER NOTES
36.0 g/dL    RDW 18.0 (H) 12.4 - 15.4 %    Platelets 722 054 - 812 K/uL    MPV 9.9 5.0 - 10.5 fL    Neutrophils % 69.6 %    Lymphocytes % 23.8 %    Monocytes % 4.3 %    Eosinophils % 1.4 %    Basophils % 0.9 %    Neutrophils Absolute 8.3 (H) 1.7 - 7.7 K/uL    Lymphocytes Absolute 2.8 1.0 - 5.1 K/uL    Monocytes Absolute 0.5 0.0 - 1.3 K/uL    Eosinophils Absolute 0.2 0.0 - 0.6 K/uL    Basophils Absolute 0.1 0.0 - 0.2 K/uL   Basic Metabolic Panel w/ Reflex to MG   Result Value Ref Range    Sodium 130 (L) 136 - 145 mmol/L    Potassium reflex Magnesium 5.7 (H) 3.5 - 5.1 mmol/L    Chloride 93 (L) 99 - 110 mmol/L    CO2 21 21 - 32 mmol/L    Anion Gap 16 3 - 16    Glucose 423 (H) 70 - 99 mg/dL    BUN 13 7 - 20 mg/dL    CREATININE 1.0 0.6 - 1.1 mg/dL    GFR Non-African American >60 >60    GFR African American >60 >60    Calcium 9.0 8.3 - 10.6 mg/dL   Blood gas, venous (Lab)   Result Value Ref Range    pH, Arthur 7.351 7.350 - 7.450    pCO2, Arthur 44.4 41.0 - 51.0 mmHg    pO2, Arthur 39.6 25.0 - 40.0 mmHg    HCO3, Venous 23.9 (L) 24.0 - 28.0 mmol/L    Base Excess, Arthur -1.2 -2.0 - 3.0 mmol/L    O2 Sat, Arthur 63 Not established %    Carboxyhemoglobin 1.8 (H) 0.0 - 1.5 %    MetHgb, Arthur 0.8 0.0 - 1.5 %    TC02 (Calc), Arthur 25 mmol/L    Hemoglobin, Arthur, Reduced 36.00 %   Urinalysis   Result Value Ref Range    Color, UA Yellow Straw/Yellow    Clarity, UA Clear Clear    Glucose, Ur >=1000 (A) Negative mg/dL    Bilirubin Urine Negative Negative    Ketones, Urine >=80 (A) Negative mg/dL    Specific Gravity, UA 1.010 1.005 - 1.030    Blood, Urine Negative Negative    pH, UA 6.0 5.0 - 8.0    Protein, UA Negative Negative mg/dL    Urobilinogen, Urine 0.2 <2.0 E.U./dL    Nitrite, Urine Negative Negative    Leukocyte Esterase, Urine Negative Negative    Microscopic Examination Not Indicated     Urine Type NotGiven    Pregnancy, urine   Result Value Ref Range    HCG(Urine) Pregnancy Test Negative Detects HCG level >20 MIU/mL   Potassium (Lab) Result Value Ref Range    Potassium 5.4 (H) 3.5 - 5.1 mmol/L   POCT Glucose   Result Value Ref Range    POC Glucose 465 (H) 70 - 99 mg/dl    Performed on ACCU-CHEK    POCT Glucose   Result Value Ref Range    POC Glucose 369 (H) 70 - 99 mg/dl    Performed on ACCU-CHEK    POCT Glucose   Result Value Ref Range    POC Glucose 354 (H) 70 - 99 mg/dl    Performed on ACCU-CHEK    POCT Glucose   Result Value Ref Range    POC Glucose 433 (H) 70 - 99 mg/dl    Performed on ACCU-CHEK        ED BEDSIDE ULTRASOUND:  None    RECENT VITALS:  BP: 114/77,Temp: 98.3 °F (36.8 °C), Pulse: 95, Resp: 16, SpO2: 96 %     Procedures     None    ED Course     Nursing Notes, Past Medical Hx, Past Surgical Hx, Social Hx,Allergies, and Family Hx were reviewed.     patient was given the following medications:  Orders Placed This Encounter   Medications    lactated ringers bolus    sodium chloride flush 0.9 % injection 10 mL    sodium chloride flush 0.9 % injection 10 mL    OR Linked Order Group     acetaminophen (TYLENOL) tablet 650 mg     acetaminophen (TYLENOL) suppository 650 mg    polyethylene glycol (GLYCOLAX) packet 17 g    OR Linked Order Group     promethazine (PHENERGAN) tablet 12.5 mg     ondansetron (ZOFRAN) injection 4 mg    enoxaparin (LOVENOX) injection 40 mg    DISCONTD: glucose (GLUTOSE) 40 % oral gel 15 g    DISCONTD: dextrose 50 % IV solution    DISCONTD: glucagon (rDNA) injection 1 mg    DISCONTD: dextrose 5 % solution    insulin glargine (LANTUS;BASAGLAR) injection pen 5 Units    insulin lispro (1 Unit Dial) 0-6 Units    insulin lispro (1 Unit Dial) 0-3 Units    DISCONTD: lactated ringers infusion    insulin regular (HUMULIN R;NOVOLIN R) injection 5 Units    glucose (GLUTOSE) 40 % oral gel 15 g    dextrose 50 % IV solution    glucagon (rDNA) injection 1 mg    dextrose 5 % solution    lactated ringers infusion    insulin lispro (1 Unit Dial) 8 Units       CONSULTS:  IP CONSULT TO HOSPITALIST  IP CONSULT TO DIABETES EDUCATOR  IP CONSULT TO SOCIAL WORK    MEDICAL DECISIONMAKING / ASSESSMENT / Efren Zamzam is a 45 y.o. female newly diagnosed with diabetes presenting with hyperglycemia. ED Course as of Aug 20 2240   Thu Aug 20, 2020   1438 On initial encounter patient is afebrile, hemodynamically stable, and in no acute distress. Physical exam is nonfocal.  Plan at this point is to check labs to rule out complications of hyperglycemia including DKA. Hydrate, recheck glucose, insulin, likely admission. [JH]   1602 Potassium was 5.7 on the initial metabolic panel with evidence of hemolysis. Unfortunately, repeat potassium also hemolyzed with potassium 5.4. Patient has no stigmata of DKA on her work-up at this point. After IV fluids patient's glucose is 369 by fingerstick. Concern given that patient is newly diagnosed with diabetes she will need medication titration and education and that admission will facilitate this in the safest fashion. Patient was agreeable to the plan for admission. I contacted the hospitalist and the patient was accepted and she was monitored in the ED closely until she was moved to her new treatment location. [JH]      ED Course User Index  [JH] Karrie Ring MD       Clinical Impression     1.  Hyperglycemia        Disposition     PATIENT REFERRED TO:  BARBY [facility]i - AMBER Phillips  [address] [place] [address] [address]. [facility]  [phone]            DISCHARGE MEDICATIONS:  Current Discharge Medication List          DISPOSITION  Admitted          Karrie Ring MD  08/20/20 224

## 2020-08-20 NOTE — LETTER
[address] [facility] [address]  Phone: [phone]             August 21, 2020    Patient: Greg Paulson   YOB: 1975   Date of Visit: 8/20/2020       To Whom It May Concern:    Austen Adam was seen and treated in our facility  beginning 8/20/2020 until 8/51/20. She may return to work on 8/24/20.       Sincerely,       Hung Morrison RN         Signature:__________________________________

## 2020-08-21 VITALS
DIASTOLIC BLOOD PRESSURE: 83 MMHG | SYSTOLIC BLOOD PRESSURE: 127 MMHG | TEMPERATURE: 97.6 F | BODY MASS INDEX: 42.86 KG/M2 | OXYGEN SATURATION: 97 % | HEART RATE: 83 BPM | RESPIRATION RATE: 18 BRPM | HEIGHT: 68 IN | WEIGHT: 282.8 LBS

## 2020-08-21 LAB
ANION GAP SERPL CALCULATED.3IONS-SCNC: 11 MMOL/L (ref 3–16)
BASOPHILS ABSOLUTE: 0 K/UL (ref 0–0.2)
BASOPHILS RELATIVE PERCENT: 0.3 %
BUN BLDV-MCNC: 10 MG/DL (ref 7–20)
CALCIUM SERPL-MCNC: 8.9 MG/DL (ref 8.3–10.6)
CHLORIDE BLD-SCNC: 98 MMOL/L (ref 99–110)
CO2: 25 MMOL/L (ref 21–32)
CREAT SERPL-MCNC: 0.9 MG/DL (ref 0.6–1.1)
EOSINOPHILS ABSOLUTE: 0.2 K/UL (ref 0–0.6)
EOSINOPHILS RELATIVE PERCENT: 2.2 %
GFR AFRICAN AMERICAN: >60
GFR NON-AFRICAN AMERICAN: >60
GLUCOSE BLD-MCNC: 288 MG/DL (ref 70–99)
GLUCOSE BLD-MCNC: 297 MG/DL (ref 70–99)
GLUCOSE BLD-MCNC: 320 MG/DL (ref 70–99)
GLUCOSE BLD-MCNC: 321 MG/DL (ref 70–99)
GLUCOSE BLD-MCNC: 352 MG/DL (ref 70–99)
HCT VFR BLD CALC: 32.8 % (ref 36–48)
HEMOGLOBIN: 10.6 G/DL (ref 12–16)
LYMPHOCYTES ABSOLUTE: 3.1 K/UL (ref 1–5.1)
LYMPHOCYTES RELATIVE PERCENT: 36.3 %
MCH RBC QN AUTO: 23.4 PG (ref 26–34)
MCHC RBC AUTO-ENTMCNC: 32.3 G/DL (ref 31–36)
MCV RBC AUTO: 72.4 FL (ref 80–100)
MONOCYTES ABSOLUTE: 0.5 K/UL (ref 0–1.3)
MONOCYTES RELATIVE PERCENT: 6 %
NEUTROPHILS ABSOLUTE: 4.8 K/UL (ref 1.7–7.7)
NEUTROPHILS RELATIVE PERCENT: 55.2 %
PDW BLD-RTO: 17.9 % (ref 12.4–15.4)
PERFORMED ON: ABNORMAL
PLATELET # BLD: 259 K/UL (ref 135–450)
PMV BLD AUTO: 9.9 FL (ref 5–10.5)
POTASSIUM REFLEX MAGNESIUM: 4.2 MMOL/L (ref 3.5–5.1)
RBC # BLD: 4.53 M/UL (ref 4–5.2)
SODIUM BLD-SCNC: 134 MMOL/L (ref 136–145)
WBC # BLD: 8.6 K/UL (ref 4–11)

## 2020-08-21 PROCEDURE — 96372 THER/PROPH/DIAG INJ SC/IM: CPT

## 2020-08-21 PROCEDURE — 6370000000 HC RX 637 (ALT 250 FOR IP): Performed by: STUDENT IN AN ORGANIZED HEALTH CARE EDUCATION/TRAINING PROGRAM

## 2020-08-21 PROCEDURE — 80048 BASIC METABOLIC PNL TOTAL CA: CPT

## 2020-08-21 PROCEDURE — 36415 COLL VENOUS BLD VENIPUNCTURE: CPT

## 2020-08-21 PROCEDURE — 2580000003 HC RX 258: Performed by: STUDENT IN AN ORGANIZED HEALTH CARE EDUCATION/TRAINING PROGRAM

## 2020-08-21 PROCEDURE — G0378 HOSPITAL OBSERVATION PER HR: HCPCS

## 2020-08-21 PROCEDURE — 85025 COMPLETE CBC W/AUTO DIFF WBC: CPT

## 2020-08-21 PROCEDURE — 6360000002 HC RX W HCPCS: Performed by: STUDENT IN AN ORGANIZED HEALTH CARE EDUCATION/TRAINING PROGRAM

## 2020-08-21 RX ORDER — GLUCOSAMINE HCL/CHONDROITIN SU 500-400 MG
CAPSULE ORAL
Qty: 100 STRIP | Refills: 0 | Status: SHIPPED | OUTPATIENT
Start: 2020-08-21

## 2020-08-21 RX ORDER — INSULIN LISPRO 100 [IU]/ML
0-12 INJECTION, SOLUTION INTRAVENOUS; SUBCUTANEOUS
Status: DISCONTINUED | OUTPATIENT
Start: 2020-08-21 | End: 2020-08-21 | Stop reason: HOSPADM

## 2020-08-21 RX ORDER — GLIPIZIDE AND METFORMIN HCL 2.5; 5 MG/1; MG/1
1 TABLET, FILM COATED ORAL
Qty: 60 TABLET | Refills: 3 | Status: SHIPPED | OUTPATIENT
Start: 2020-08-21 | End: 2020-08-21 | Stop reason: SDUPTHER

## 2020-08-21 RX ORDER — INSULIN LISPRO 100 [IU]/ML
3 INJECTION, SOLUTION INTRAVENOUS; SUBCUTANEOUS
Status: DISCONTINUED | OUTPATIENT
Start: 2020-08-21 | End: 2020-08-21 | Stop reason: HOSPADM

## 2020-08-21 RX ORDER — LANCETS 30 GAUGE
1 EACH MISCELLANEOUS 2 TIMES DAILY
Qty: 300 EACH | Refills: 1 | Status: SHIPPED | OUTPATIENT
Start: 2020-08-21

## 2020-08-21 RX ORDER — INSULIN LISPRO 100 [IU]/ML
6 INJECTION, SOLUTION INTRAVENOUS; SUBCUTANEOUS ONCE
Status: COMPLETED | OUTPATIENT
Start: 2020-08-21 | End: 2020-08-21

## 2020-08-21 RX ORDER — INSULIN LISPRO 100 [IU]/ML
0-6 INJECTION, SOLUTION INTRAVENOUS; SUBCUTANEOUS NIGHTLY
Status: DISCONTINUED | OUTPATIENT
Start: 2020-08-21 | End: 2020-08-21 | Stop reason: HOSPADM

## 2020-08-21 RX ORDER — GLIPIZIDE AND METFORMIN HCL 2.5; 5 MG/1; MG/1
1 TABLET, FILM COATED ORAL
Qty: 60 TABLET | Refills: 3 | Status: ON HOLD | OUTPATIENT
Start: 2020-08-21 | End: 2021-03-09 | Stop reason: HOSPADM

## 2020-08-21 RX ORDER — GLUCOSAMINE HCL/CHONDROITIN SU 500-400 MG
CAPSULE ORAL
Qty: 100 STRIP | Refills: 0 | Status: SHIPPED | OUTPATIENT
Start: 2020-08-21 | End: 2021-08-10 | Stop reason: SDUPTHER

## 2020-08-21 RX ADMIN — INSULIN LISPRO 3 UNITS: 100 INJECTION, SOLUTION INTRAVENOUS; SUBCUTANEOUS at 08:05

## 2020-08-21 RX ADMIN — SODIUM CHLORIDE, POTASSIUM CHLORIDE, SODIUM LACTATE AND CALCIUM CHLORIDE: 600; 310; 30; 20 INJECTION, SOLUTION INTRAVENOUS at 01:30

## 2020-08-21 RX ADMIN — INSULIN LISPRO 3 UNITS: 100 INJECTION, SOLUTION INTRAVENOUS; SUBCUTANEOUS at 12:19

## 2020-08-21 RX ADMIN — INSULIN LISPRO 10 UNITS: 100 INJECTION, SOLUTION INTRAVENOUS; SUBCUTANEOUS at 12:20

## 2020-08-21 RX ADMIN — ENOXAPARIN SODIUM 40 MG: 40 INJECTION SUBCUTANEOUS at 09:06

## 2020-08-21 RX ADMIN — SODIUM CHLORIDE, POTASSIUM CHLORIDE, SODIUM LACTATE AND CALCIUM CHLORIDE: 600; 310; 30; 20 INJECTION, SOLUTION INTRAVENOUS at 08:17

## 2020-08-21 RX ADMIN — INSULIN LISPRO 6 UNITS: 100 INJECTION, SOLUTION INTRAVENOUS; SUBCUTANEOUS at 01:31

## 2020-08-21 RX ADMIN — Medication 10 ML: at 08:35

## 2020-08-21 ASSESSMENT — PAIN SCALES - GENERAL
PAINLEVEL_OUTOF10: 0
PAINLEVEL_OUTOF10: 0

## 2020-08-21 NOTE — DISCHARGE INSTR - COC
Continuity of Care Form    Patient Name: Emily Quintero   :  1975  MRN:  [id number]    Admit date:  2020  Discharge date:  ***    Code Status Order: Full Code   Advance Directives:   Advance Care Flowsheet Documentation     Date/Time Healthcare Directive Type of Healthcare Directive Copy in [address] Agent's Name Healthcare Agent's Phone Number    20 1831  No, patient does not have an advance directive for healthcare treatment -- -- -- -- --          Admitting Physician:  Nadia Holliday MD  PCP: BARBY Clifton - CNP    Discharging Nurse: [facility] Unit/Room#: 3982/6666-86  Discharging Unit Phone Number: ***    Emergency Contact:   Extended Emergency Contact Information  Primary Emergency Contact: [address] [place] [address] Phone: [phone]  Mobile Phone: [phone]  Relation: Parent    Past Surgical History:  Past Surgical History:   Procedure Laterality Date    APPENDECTOMY         Immunization History: There is no immunization history on file for this patient.     Active Problems:  Patient Active Problem List   Diagnosis Code    Diabetes mellitus ([facility] [facility] E11.9    New onset type 2 diabetes mellitus ([facility] [address] [facility] E11.9       Isolation/Infection:   Isolation          No Isolation        Patient Infection Status     None to display          Nurse Assessment:  Last Vital Signs: /66   Pulse 85   Temp 98.3 °F (36.8 °C) (Oral)   Resp 18   Ht 5' 7.8\" (1.722 m)   Wt 282 lb 12.8 oz (128.3 kg)   LMP 2020   SpO2 98%   BMI 43.25 kg/m²     Last documented pain score (0-10 scale): Pain Level: 0  Last Weight:   Wt Readings from Last 1 Encounters:   20 282 lb 12.8 oz (128.3 kg)     Mental Status:  {IP PT MENTAL STATUS:}    IV Access:  { AMIRAH IV ACCESS:685105145}    Nursing Mobility/ADLs:  Walking   {CHP DME SYSZ:727430340}  Transfer  {CHP DME JXQF:937470203}  Bathing  {CHP DME NJOA:080168614}  Dressing  {CHP DME SPOI:118970755}  Toileting  {[facility] DME JFJJ:746139419}  Feeding  {[facility] DME AGHQ:382072529}  Med Admin  {[facility] DME PAZQ:635181049}  Med Delivery   { AMIRAH MED Delivery:034500661}    Wound Care Documentation and Therapy:        Elimination:  Continence:   · Bowel: {YES / QZ:79102}  · Bladder: {YES / MP:25009}  Urinary Catheter: {Urinary Catheter:459811650}   Colostomy/Ileostomy/Ileal Conduit: {YES / PC:33672}       Date of Last BM: ***    Intake/Output Summary (Last 24 hours) at 2020 1101  Last data filed at 2020 0841  Gross per 24 hour   Intake 2172.5 ml   Output --   Net 2172.5 ml     I/O last 3 completed shifts: In: 1932.5 [P.O.:240;  I.V.:1692.5]  Out: -     Safety Concerns:     [employer] Safety Concerns:061093815}    Impairments/Disabilities:      [employer] Impairments/Disabilities:948499058}    Nutrition Therapy:  Current Nutrition Therapy:   [employer] Diet List:837486244}    Routes of Feeding: {[facility] DME Other Feedings:863043359}  Liquids: {Slp liquid thickness:22332}  Daily Fluid Restriction: {[facility] DME Yes amt example:208214170}  Last Modified Barium Swallow with Video (Video Swallowing Test): {Done Not Done PFKF:654986601}    Treatments at the Time of Hospital Discharge:   Respiratory Treatments: ***  Oxygen Therapy:  {Therapy; copd oxygen:75029}  Ventilator:    { CC Vent JUJN:134810075}    Rehab Therapies: {THERAPEUTIC INTERVENTION:7830510410}  Weight Bearing Status/Restrictions: [employer] Weight Bearin}  Other Medical Equipment (for information only, NOT a DME order):  {EQUIPMENT:486388057}  Other Treatments: ***    Patient's personal belongings (please select all that are sent with patient):  {[facility] DME Belongings:883768082}    RN SIGNATURE:  {Esignature:169520391}    CASE MANAGEMENT/SOCIAL WORK SECTION    Inpatient Status Date: ***    Readmission Risk Assessment Score:  Readmission Risk              Risk of Unplanned Readmission:        0           Discharging to Facility/ Agency   · Name: · Address:  · Phone:  · Fax:    Dialysis Facility (if applicable)   · Name:  · Address:  · Dialysis Schedule:  · Phone:  · Fax:    / signature: {Esignature:857310588}    PHYSICIAN SECTION    Prognosis: {Prognosis:8877584401}    Condition at Discharge: Gennaro Cabrera Patient Condition:874793726}    Rehab Potential (if transferring to Rehab): {Prognosis:9023849058}    Recommended Labs or Other Treatments After Discharge: ***    Physician Certification: I certify the above information and transfer of Lexi Silva  is necessary for the continuing treatment of the diagnosis listed and that she requires {Admit to Appropriate Level of Care:66211} for {GREATER/LESS:820433909} 30 days. Update Admission H&P: {CHP DME Changes in WIZY:841837935}    PHYSICIAN SIGNATURE:  {Esignature:690201909}   Diabetic Care/AT HOME INSTRUCTIONS    Special At Home Instructions for Patients with Diabetes:    HOME CARE INSTRUCTIONS:   Test your blood sugar   Follow a healthy meal plan   Follow your health-care providers advice for exercise   Take your medications   Keep good records of medications and blood glucose monitoring.  Keep follow-up appointment with your doctor   Try to manage stress   Know how to manage Diabetes on sick days   Know causes, ymptoms and treatment of high/low blood sugar.  Know how to test for ketones (Type I)    TAKING YOUR MEDICINES:   Take your medicines at the time your doctor ordered.  Do not skip a dose of your medicines  If you miss a dose of medicine, take it as soon as possible,   but DO NOT DOUBLE A DOSE   Read your medicine information when you get home  o Know all side effects of your medicines  o Call your doctors office if you have any side effects   Plan ahead so you do not run out of medicine.  Be sure all your doctors know medicines and herbs that you take (including cold, flu, and herbal medicines)   Carry a list of your medicines with you.       LIFE STYLE CHANGES:   Follow your meal plan as directed.  Break the smoking habit.  Do not skip meals   Take all your medications   Wear Diabetes ID Bracelet   Stay active (Follow doctors recommendations for starting exercise program)   Monitor blood sugar and report to physician blood sugars that are out of target range   Get yearly dilated eye exam   Get dental exam every six months.    Do daily foot exam    QUIT SMOKING/TOBACCO USE:   It is important for you to quit smoking   Please talk with your doctor and ask about different ways for you to stop    DIABETES PATIENTS: Seek care immediately if:  o You are having trouble staying awake or focusing on thing  o You are shaking or sweating  o You have blurred or double vision  o Your breath has a fruity sweet smell or your breathing is shallow (not deep)  o Your heartbeat is fast and weak  o Blood surgar is above 240 mg/dl or less then 70 mg.dl.  o Unrelieved shortness of breath  o Chest pain or discomfort

## 2020-08-21 NOTE — DISCHARGE SUMMARY
was significantly lower, but still elevated. She was discharged on combination metformin/ glipizide. She was given instructions to follow-up with her PCP regarding cholesterol, liver function testing, and long-term diabetes management. Also recommended to see an ophtalmologist.    Disposition:  Home    Physical Exam Performed:     /83   Pulse 83   Temp 97.6 °F (36.4 °C) (Oral)   Resp 18   Ht 5' 7.8\" (1.722 m)   Wt 282 lb 12.8 oz (128.3 kg)   LMP 08/20/2020   SpO2 97%   BMI 43.25 kg/m²     Physical Exam     Constitutional:       Appearance: Normal appearance. She is obese. No acute distress. HENT:      Head: Normocephalic and atraumatic. Eyes:      Conjunctiva/sclera: Conjunctivae normal.   Neck:      Musculoskeletal: No neck rigidity or muscular tenderness. Cardiovascular:      Rate and Rhythm: Normal rate and regular rhythm. Heart sounds: No murmur. No friction rub. No gallop. Pulmonary:      Effort: No respiratory distress. Breath sounds: No stridor. No wheezing or rales. Abdominal:      General: Abdomen is flat. Bowel sounds are normal.      Palpations: Abdomen is soft. Tenderness: There is no guarding. Skin:     General: Skin is warm and dry. Neurological:      General: No focal deficit present. Mental Status: She is alert and oriented to person, place, and time. Labs:  For convenience and continuity at follow-up the following most recent labs are provided:      CBC:    Lab Results   Component Value Date    WBC 8.6 08/21/2020    HGB 10.6 08/21/2020    HCT 32.8 08/21/2020     08/21/2020       Renal:    Lab Results   Component Value Date     08/21/2020    K 4.2 08/21/2020    CL 98 08/21/2020    CO2 25 08/21/2020    BUN 10 08/21/2020    CREATININE 0.9 08/21/2020    CALCIUM 8.9 08/21/2020         Significant Diagnostic Studies    Radiology:   No orders to display          Consults:     IP CONSULT TO HOSPITALIST  IP CONSULT TO DIABETES EDUCATOR  IP CONSULT TO SOCIAL WORK      Discharge Instructions/Follow-up: Please start your new medication after discharge today. Please make an appointment  with your primary care provider(PCP) for early next week   Take your blood sugar 2-3 per day and record it and bring to your appointment with your Primary care doctor  Your Liver enzymes were elevated please address with PCP      Activity: activity as tolerated    Diet: diabetic diet    Discharge Medications:     Current Discharge Medication List           Details   Lancets MISC 1 each by Does not apply route 2 times daily  Qty: 300 each, Refills: 1      !! blood glucose monitor strips Test 2 times a day & as needed for symptoms of irregular blood glucose. Dispense sufficient amount for indicated testing frequency plus additional to accommodate PRN testing needs. Qty: 100 strip, Refills: 0    Comments: Brand per patient preference. May round up to next available package size. !! blood glucose monitor strips Test 2 times a day & as needed for symptoms of irregular blood glucose. Dispense sufficient amount for indicated testing frequency plus additional to accommodate PRN testing needs. Qty: 100 strip, Refills: 0    Comments: Brand per patient preference. May round up to next available package size. glipiZIDE-metformin (METAGLIP) 2.5-500 MG per tablet Take 1 tablet by mouth 2 times daily (before meals)  Qty: 60 tablet, Refills: 3       !! - Potential duplicate medications found. Please discuss with provider. Details   glucose monitoring kit (FREESTYLE) monitoring kit 1 kit by Does not apply route daily  Qty: 1 kit, Refills: 0      !! blood glucose monitor strips Test 3 times a day & as needed for symptoms of irregular blood glucose. Dispense sufficient amount for indicated testing frequency plus additional to accommodate PRN testing needs. Qty: 90 strip, Refills: 0    Comments: Brand per patient preference. May round up to next available package size. ibuprofen (ADVIL;MOTRIN) 800 MG tablet Take 1 tablet by mouth every 8 hours as needed for Pain or Fever  Qty: 20 tablet, Refills: 0       !! - Potential duplicate medications found. Please discuss with provider. Time Spent on discharge is more than 30 minutes in the examination, evaluation, counseling and review of medications and discharge plan. Signed:    Owen Hurd MD   Internal Medicine Resident, PGY-1  8/21/2020      Thank you BARBY Rausch CNP for the opportunity to be involved in this patient's care. If you have any questions or concerns please feel free to contact me.

## 2020-08-21 NOTE — PLAN OF CARE
Problem: Pain:  Goal: Pain level will decrease  Description: Pain level will decrease  8/20/2020 2237 by Live Acevedo RN  Outcome: Ongoing  Note: Pt not complaining of pain at this time. Will continue to monitor.

## 2020-08-21 NOTE — CARE COORDINATION
Case Management Assessment            Discharge Note                    Date / Time of Note: 8/21/2020 12:07 PM                  Discharge Note Completed by: Gwen Mei    Patient Name: Radha Henry   YOB: 1975  Diagnosis: New onset type 2 diabetes mellitus ([facility] [address] [E11.9]  New onset type 2 diabetes mellitus ([facility] [address] [E11.9]   Date / Time: 8/20/2020  1:41 PM    Current PCP: BARBY Vazquez CNP  Clinic patient: No    Hospitalization in the last 30 days: No    Advance Directives:  Code Status: Full Code  [place] DNR form completed and on chart: No    Financial:  Payor: Esequiel Hernandez / Plan: Dari Chin PPO / Product Type: *No Product type* /      Pharmacy:    [facility] #[id number] - [facility], [address] [place] [address] [phone] - F [phone]  [place]  [address]  Phone: [phone] Fax: [phone]      Assistance purchasing medications?:    Assistance provided by Case Management: None at this time    Does patient want to participate in local refill/ meds to beds program?:      Meds To Beds General Rules:  1. Can ONLY be done Monday- Friday between 8:30am-5pm  2. Prescription(s) must be in pharmacy by 3pm to be filled same day  3. Copy of patient's insurance/ prescription drug card and patient face sheet must be sent along with the prescription(s)  4. Cost of Rx cannot be added to hospital bill. If financial assistance is needed, please contact unit  or ;  or  CANNOT provide pharmacy voucher for patients co-pays  5.  Patients can then  the prescription on their way out of the hospital at discharge, or pharmacy can deliver to the bedside if staff is available. (payment due at time of pick-up or delivery - cash, check, or card accepted)     Able to afford home medications/ co-pay costs: Yes    ADLS:  Current PT AM-PAC Score:   /24  Current OT AM-PAC Score:   /24      DISCHARGE Disposition: Home- No Services Needed    LOC at discharge: Not Applicable  AMIRAH Completed: Not Indicated    Notification completed in HENS/PAS?:  Not Applicable    IMM Completed:   Not Indicated    Transportation:  Transportation PLAN for discharge: family   Mode of Transport: Private Car  Reason for medical transport: Not Applicable  Name of Transport Company: Not Applicable  Time of Transport: when available    Transport form completed: No    Home Care:  [address] ordered at discharge: No  [address]: Not Applicable  Orders faxed: No    Durable Medical Equipment:  DME Provider: NA   Equipment obtained during hospitalization: NA    Home Oxygen and Respiratory Equipment:  Oxygen needed at discharge?: No    Dialysis:  Dialysis patient: No    Dialysis Center:  Not Applicable    Hospice Services:  Location: Not Applicable  Agency: Not Applicable    Consents signed: No    Referrals made at [facility] for outpatient continued care:  Not Applicable    Additional CM Notes:     CM confirmed  D/C home . Will follow up out pt  With PCP in 1 week :  New Rx:     Pick these up from any pharmacy with your printed prescription   1 blood glucose test strips   2 glipiZIDE-metformin   3 Lancets    RN to teach and review Diabetic education prior to d/c and provide resource folder. The Plan for Transition of Care is related to the following treatment goals of New onset type 2 diabetes mellitus ([address] [E11.9]  New onset type 2 diabetes mellitus ([facility] [address] [E11.9]    The Patient and/or patient representative Josefina Treviño and her family were provided with a choice of provider and agrees with the discharge plan Yes    Freedom of choice list was provided with basic dialogue that supports the patient's individualized plan of care/goals and shares the quality data associated with the providers.  Yes    Care Transitions patient: No    Ryan Earl RN  [facility]  Case Management Department  Ph: [phone]

## 2020-08-21 NOTE — PROGRESS NOTES
Nutrition Education    Pt seen for diet education, admitted for new dx of Type 2 Diabetes. Provided pt with written and verbal instruction on Carbohydrate Counting nutrition therapy. Discussed carb choices/gm for meals and snacks, sources of carbohydrates, and consistent intakes for BG control. Pt voiced understanding. Reinforcement needed. · Verbally reviewed information with Patient  · Educated on Carbohydrate Counting   · Written educational materials provided. · Contact name and number provided. · Refer to Patient Education activity for more details.     Electronically signed by Benjamin Luz RD, STEPHANIE on 8/21/20 at 9:33 AM EDT    Contact: [phone]

## 2020-08-21 NOTE — PROGRESS NOTES
Pt discharged to home. IV removed. Discharge instructions reviewed with patient and education regarding new diagnosis of diabetes provided. All of personal belongings sent home with patient.

## 2020-08-26 ENCOUNTER — TELEPHONE (OUTPATIENT)
Dept: FAMILY MEDICINE CLINIC | Age: 45
End: 2020-08-26

## 2020-08-26 NOTE — TELEPHONE ENCOUNTER
----- Message from Romeo Lucero sent at 8/25/2020  8:31 AM EDT -----  Subject: Hospital Follow Up    QUESTIONS  What hospital was the Patient Discharged from? [facility]   Date of Discharge? 2020-08-21  Discharge Location? Home  Reason for hospitalization as patient stated? diabetic  What question does the patient have   if applicable?   ---------------------------------------------------------------------------  --------------  CALL BACK INFO  What is the best way for the office to contact you? OK to leave message on   voicemail  Preferred Call Back Phone Number? [phone]  ---------------------------------------------------------------------------  --------------  SCRIPT ANSWERS  Relationship to Patient?  Self

## 2020-09-02 ENCOUNTER — OFFICE VISIT (OUTPATIENT)
Dept: FAMILY MEDICINE CLINIC | Age: 45
End: 2020-09-02
Payer: COMMERCIAL

## 2020-09-02 VITALS
HEART RATE: 85 BPM | BODY MASS INDEX: 43.13 KG/M2 | WEIGHT: 282 LBS | DIASTOLIC BLOOD PRESSURE: 76 MMHG | TEMPERATURE: 97.3 F | OXYGEN SATURATION: 97 % | SYSTOLIC BLOOD PRESSURE: 112 MMHG

## 2020-09-02 PROCEDURE — 1111F DSCHRG MED/CURRENT MED MERGE: CPT | Performed by: NURSE PRACTITIONER

## 2020-09-02 PROCEDURE — 99214 OFFICE O/P EST MOD 30 MIN: CPT | Performed by: NURSE PRACTITIONER

## 2020-09-02 RX ORDER — PEN NEEDLE, DIABETIC 31 GX5/16"
1 NEEDLE, DISPOSABLE MISCELLANEOUS DAILY
Qty: 100 EACH | Refills: 3 | Status: SHIPPED | OUTPATIENT
Start: 2020-09-02 | End: 2021-08-10 | Stop reason: SDUPTHER

## 2020-09-02 RX ORDER — BLOOD SUGAR DIAGNOSTIC
1 STRIP MISCELLANEOUS 4 TIMES DAILY
Qty: 1 EACH | Refills: 2 | Status: SHIPPED | OUTPATIENT
Start: 2020-09-02

## 2020-09-02 RX ORDER — INSULIN GLARGINE 100 [IU]/ML
12 INJECTION, SOLUTION SUBCUTANEOUS NIGHTLY
Qty: 5 PEN | Refills: 3 | Status: SHIPPED | OUTPATIENT
Start: 2020-09-02 | End: 2021-03-04

## 2020-09-02 NOTE — ASSESSMENT & PLAN NOTE
Patient is agreed to start on injectable daily insulin. She would like to avoid the short acting insulin if all possible. Patient was given a sample of [place] and self injected 12 units successfully. I have instructed her to increase by 2 units every 2 days until her morning blood sugars are in the 120 range. She verbalizes understanding of this. I have ordered Lantus for her however will have to work with her insurance to get covered for the glargine category. I reinforced that she needs to remain on the glipizide metformin as well. She is going to call the diabetic services to get into diabetic teaching classes.

## 2020-09-02 NOTE — PROGRESS NOTES
additional to accommodate PRN testing needs. 100 strip 0    blood glucose monitor strips Test 2 times a day & as needed for symptoms of irregular blood glucose. Dispense sufficient amount for indicated testing frequency plus additional to accommodate PRN testing needs. 100 strip 0    glipiZIDE-metformin (METAGLIP) 2.5-500 MG per tablet Take 1 tablet by mouth 2 times daily (before meals) 60 tablet 3    glucose monitoring kit (FREESTYLE) monitoring kit 1 kit by Does not apply route daily 1 kit 0    blood glucose monitor strips Test 3 times a day & as needed for symptoms of irregular blood glucose. Dispense sufficient amount for indicated testing frequency plus additional to accommodate PRN testing needs. 90 strip 0    ibuprofen (ADVIL;MOTRIN) 800 MG tablet Take 1 tablet by mouth every 8 hours as needed for Pain or Fever 20 tablet 0     No current facility-administered medications for this visit. Patient's past medical history, surgical history, family history, medications,  andallergies  were all reviewed and updated as appropriate today. Objective:     Vitals:    09/02/20 0852   BP: 112/76   Pulse: 85   Temp: 97.3 °F (36.3 °C)   SpO2: 97%     Physical Exam  Constitutional:       Appearance: She is well-developed. HENT:      Head: Normocephalic. Eyes:      Pupils: Pupils are equal, round, and reactive to light. Neck:      Musculoskeletal: Normal range of motion. Cardiovascular:      Rate and Rhythm: Normal rate and regular rhythm. Heart sounds: Normal heart sounds. Pulmonary:      Effort: Pulmonary effort is normal.      Breath sounds: Normal breath sounds. Musculoskeletal: Normal range of motion. Skin:     General: Skin is warm and dry. Neurological:      Mental Status: She is alert and oriented to person, place, and time. Assessment      Encounter Diagnosis   Name Primary?     New onset type 2 diabetes mellitus ([facility] [address] Yes       PLAN:  Health Maintenance   Topic Date Due    Pneumococcal 0-64 years Vaccine (1 of 1 - PPSV23) 11/30/1983    Diabetic foot exam  11/30/1987    A1C test (Diabetic or Prediabetic)  11/30/1987    Diabetic retinal exam  11/30/1987    HIV screen  11/30/1992    Diabetic microalbuminuria test  11/30/1995    Hepatitis B vaccine (1 of 3 - Risk 3-dose series) 11/30/1996    DTaP/Tdap/Td vaccine (1 - Tdap) 11/30/1996    Cervical cancer screen  11/30/1998    Flu vaccine (1) 09/01/2020    Lipid screen  08/20/2021    Hepatitis A vaccine  Aged Out    Hib vaccine  Aged Out    Meningococcal (ACWY) vaccine  Aged Out     1. New onset type 2 diabetes mellitus (HCC)    - insulin glargine (LANTUS SOLOSTAR) 100 UNIT/ML injection pen; Inject 12 Units into the skin nightly Increase dose as instructed  Dispense: 5 pen; Refill: 3  - Insulin Pen Needle ([employer] PEN NEEDLES 31G) 31G X 8 MM MISC; 1 each by Does not apply route daily  Dispense: 100 each; Refill: 3  - Alcohol Swabs (ALCOHOL PADS) 70 % PADS; 1 box by Does not apply route 4 times daily  Dispense: 1 each; Refill: 2  - [place] DISCHARGE MEDS RECONCILED W/ CURRENT OUTPATIENT MED LIST    [facility]  Patient is agreed to start on injectable daily insulin. She would like to avoid the short acting insulin if all possible. Patient was given a sample of [place] and self injected 12 units successfully. I have instructed her to increase by 2 units every 2 days until her morning blood sugars are in the 120 range. She verbalizes understanding of this. I have ordered Lantus for her however will have to work with her insurance to get covered for the glargine category. I reinforced that she needs to remain on the glipizide metformin as well. She is going to call the diabetic services to get into diabetic teaching classes. Return in 1 month (on 10/2/2020).   Greater than 50% of this 25-minute face-to-face visit was spent on coordination and counseling on the use of injectable insulin, techniques, signs and symptoms of low blood sugar, and reinforcement of home blood sugar monitoring.   BARBY Santillan - CNP  [place]  9/2/2020

## 2021-03-04 ENCOUNTER — HOSPITAL ENCOUNTER (EMERGENCY)
Age: 46
Discharge: HOME | DRG: 638 | End: 2021-03-04
Attending: EMERGENCY MEDICINE
Payer: COMMERCIAL

## 2021-03-04 ENCOUNTER — OFFICE VISIT (OUTPATIENT)
Dept: FAMILY MEDICINE CLINIC | Age: 46
End: 2021-03-04
Payer: COMMERCIAL

## 2021-03-04 VITALS
WEIGHT: 290 LBS | RESPIRATION RATE: 18 BRPM | DIASTOLIC BLOOD PRESSURE: 84 MMHG | OXYGEN SATURATION: 100 % | HEIGHT: 69 IN | HEART RATE: 85 BPM | SYSTOLIC BLOOD PRESSURE: 139 MMHG | BODY MASS INDEX: 42.95 KG/M2 | TEMPERATURE: 98.7 F

## 2021-03-04 VITALS
HEIGHT: 69 IN | DIASTOLIC BLOOD PRESSURE: 78 MMHG | HEART RATE: 104 BPM | TEMPERATURE: 97.8 F | WEIGHT: 274 LBS | SYSTOLIC BLOOD PRESSURE: 122 MMHG | BODY MASS INDEX: 40.58 KG/M2 | OXYGEN SATURATION: 98 %

## 2021-03-04 DIAGNOSIS — E11.9 NEW ONSET TYPE 2 DIABETES MELLITUS (HCC): Primary | ICD-10-CM

## 2021-03-04 DIAGNOSIS — E11.65 TYPE 2 DIABETES MELLITUS WITH HYPERGLYCEMIA, WITHOUT LONG-TERM CURRENT USE OF INSULIN (HCC): Primary | ICD-10-CM

## 2021-03-04 PROBLEM — R11.2 NAUSEA AND VOMITING: Status: RESOLVED | Noted: 2017-05-11 | Resolved: 2021-03-04

## 2021-03-04 PROBLEM — R11.2 NAUSEA AND VOMITING: Status: ACTIVE | Noted: 2017-05-11

## 2021-03-04 LAB
ANION GAP SERPL CALCULATED.3IONS-SCNC: 16 MMOL/L (ref 3–16)
BACTERIA: ABNORMAL /HPF
BASE EXCESS VENOUS: -1.8 MMOL/L (ref -2–3)
BASOPHILS ABSOLUTE: 0.1 K/UL (ref 0–0.2)
BASOPHILS RELATIVE PERCENT: 0.7 %
BILIRUBIN URINE: NEGATIVE
BLOOD, URINE: ABNORMAL
BUN BLDV-MCNC: 13 MG/DL (ref 7–20)
CALCIUM SERPL-MCNC: 9.6 MG/DL (ref 8.3–10.6)
CARBOXYHEMOGLOBIN: 1 % (ref 0–1.5)
CHLORIDE BLD-SCNC: 92 MMOL/L (ref 99–110)
CLARITY: CLEAR
CO2: 22 MMOL/L (ref 21–32)
COLOR: YELLOW
CREAT SERPL-MCNC: 1 MG/DL (ref 0.6–1.1)
EKG ATRIAL RATE: 95 BPM
EKG DIAGNOSIS: NORMAL
EKG P AXIS: 60 DEGREES
EKG P-R INTERVAL: 142 MS
EKG Q-T INTERVAL: 356 MS
EKG QRS DURATION: 76 MS
EKG QTC CALCULATION (BAZETT): 447 MS
EKG R AXIS: 20 DEGREES
EKG T AXIS: 18 DEGREES
EKG VENTRICULAR RATE: 95 BPM
EOSINOPHILS ABSOLUTE: 0.1 K/UL (ref 0–0.6)
EOSINOPHILS RELATIVE PERCENT: 0.7 %
EPITHELIAL CELLS, UA: ABNORMAL /HPF (ref 0–5)
GFR AFRICAN AMERICAN: >60
GFR NON-AFRICAN AMERICAN: >60
GLUCOSE BLD-MCNC: 295 MG/DL (ref 70–99)
GLUCOSE BLD-MCNC: 408 MG/DL (ref 70–99)
GLUCOSE BLD-MCNC: 597 MG/DL (ref 70–99)
GLUCOSE BLD-MCNC: 653 MG/DL (ref 70–99)
GLUCOSE URINE: >=1000 MG/DL
HCG(URINE) PREGNANCY TEST: NEGATIVE
HCO3 VENOUS: 23.9 MMOL/L (ref 24–28)
HCT VFR BLD CALC: 40.1 % (ref 36–48)
HEMOGLOBIN: 12.7 G/DL (ref 12–16)
KETONES, URINE: 40 MG/DL
LEUKOCYTE ESTERASE, URINE: NEGATIVE
LYMPHOCYTES ABSOLUTE: 2.7 K/UL (ref 1–5.1)
LYMPHOCYTES RELATIVE PERCENT: 22.5 %
MCH RBC QN AUTO: 24 PG (ref 26–34)
MCHC RBC AUTO-ENTMCNC: 31.8 G/DL (ref 31–36)
MCV RBC AUTO: 75.4 FL (ref 80–100)
METHEMOGLOBIN VENOUS: 0.4 % (ref 0–1.5)
MICROSCOPIC EXAMINATION: YES
MONOCYTES ABSOLUTE: 0.6 K/UL (ref 0–1.3)
MONOCYTES RELATIVE PERCENT: 5 %
NEUTROPHILS ABSOLUTE: 8.4 K/UL (ref 1.7–7.7)
NEUTROPHILS RELATIVE PERCENT: 71.1 %
NITRITE, URINE: NEGATIVE
O2 SAT, VEN: 97 %
PCO2, VEN: 43.5 MMHG (ref 41–51)
PDW BLD-RTO: 19.2 % (ref 12.4–15.4)
PERFORMED ON: ABNORMAL
PH UA: 5.5 (ref 5–8)
PH VENOUS: 7.35 (ref 7.35–7.45)
PLATELET # BLD: 275 K/UL (ref 135–450)
PMV BLD AUTO: 10.2 FL (ref 5–10.5)
PO2, VEN: 86.3 MMHG (ref 25–40)
POTASSIUM REFLEX MAGNESIUM: 5 MMOL/L (ref 3.5–5.1)
PROTEIN UA: NEGATIVE MG/DL
RBC # BLD: 5.32 M/UL (ref 4–5.2)
RBC UA: ABNORMAL /HPF (ref 0–4)
SODIUM BLD-SCNC: 130 MMOL/L (ref 136–145)
SPECIFIC GRAVITY UA: 1.01 (ref 1–1.03)
TCO2 CALC VENOUS: 25 MMOL/L
URINE REFLEX TO CULTURE: ABNORMAL
URINE TYPE: ABNORMAL
UROBILINOGEN, URINE: 0.2 E.U./DL
WBC # BLD: 11.8 K/UL (ref 4–11)
WBC UA: ABNORMAL /HPF (ref 0–5)

## 2021-03-04 PROCEDURE — 81001 URINALYSIS AUTO W/SCOPE: CPT

## 2021-03-04 PROCEDURE — 82803 BLOOD GASES ANY COMBINATION: CPT

## 2021-03-04 PROCEDURE — 85025 COMPLETE CBC W/AUTO DIFF WBC: CPT

## 2021-03-04 PROCEDURE — 6370000000 HC RX 637 (ALT 250 FOR IP): Performed by: EMERGENCY MEDICINE

## 2021-03-04 PROCEDURE — 96372 THER/PROPH/DIAG INJ SC/IM: CPT

## 2021-03-04 PROCEDURE — 99283 EMERGENCY DEPT VISIT LOW MDM: CPT

## 2021-03-04 PROCEDURE — 99214 OFFICE O/P EST MOD 30 MIN: CPT | Performed by: NURSE PRACTITIONER

## 2021-03-04 PROCEDURE — 80048 BASIC METABOLIC PNL TOTAL CA: CPT

## 2021-03-04 PROCEDURE — 84703 CHORIONIC GONADOTROPIN ASSAY: CPT

## 2021-03-04 PROCEDURE — 93005 ELECTROCARDIOGRAM TRACING: CPT | Performed by: EMERGENCY MEDICINE

## 2021-03-04 PROCEDURE — 2580000003 HC RX 258: Performed by: EMERGENCY MEDICINE

## 2021-03-04 RX ORDER — BLOOD SUGAR DIAGNOSTIC
1 STRIP MISCELLANEOUS 3 TIMES DAILY
Qty: 100 EACH | Refills: 3 | Status: SHIPPED | OUTPATIENT
Start: 2021-03-04

## 2021-03-04 RX ORDER — LANCETS
1 EACH MISCELLANEOUS DAILY
Qty: 100 EACH | Refills: 3 | Status: SHIPPED | OUTPATIENT
Start: 2021-03-04 | End: 2021-08-10 | Stop reason: SDUPTHER

## 2021-03-04 RX ORDER — SODIUM CHLORIDE, SODIUM LACTATE, POTASSIUM CHLORIDE, CALCIUM CHLORIDE 600; 310; 30; 20 MG/100ML; MG/100ML; MG/100ML; MG/100ML
1000 INJECTION, SOLUTION INTRAVENOUS ONCE
Status: COMPLETED | OUTPATIENT
Start: 2021-03-04 | End: 2021-03-04

## 2021-03-04 RX ORDER — INSULIN LISPRO 100 [IU]/ML
10 INJECTION, SOLUTION INTRAVENOUS; SUBCUTANEOUS ONCE
Status: DISCONTINUED | OUTPATIENT
Start: 2021-03-04 | End: 2021-03-04

## 2021-03-04 RX ORDER — PEN NEEDLE, DIABETIC 31 GX5/16"
1 NEEDLE, DISPOSABLE MISCELLANEOUS DAILY
Qty: 100 EACH | Refills: 3 | Status: SHIPPED | OUTPATIENT
Start: 2021-03-04

## 2021-03-04 RX ADMIN — SODIUM CHLORIDE, POTASSIUM CHLORIDE, SODIUM LACTATE AND CALCIUM CHLORIDE 1000 ML: 600; 310; 30; 20 INJECTION, SOLUTION INTRAVENOUS at 14:29

## 2021-03-04 RX ADMIN — SODIUM CHLORIDE, SODIUM LACTATE, POTASSIUM CHLORIDE, AND CALCIUM CHLORIDE 1000 ML: .6; .31; .03; .02 INJECTION, SOLUTION INTRAVENOUS at 11:30

## 2021-03-04 RX ADMIN — INSULIN HUMAN 10 UNITS: 100 INJECTION, SOLUTION PARENTERAL at 14:36

## 2021-03-04 NOTE — PROGRESS NOTES
Mike García (:  1975) is a 39 y.o. female,Established patient, here for evaluation of the following chief complaint(s):  Follow-Up from Hospital (diabetic supplies )      ASSESSMENT/PLAN:  1. New onset type 2 diabetes mellitus ([facility] [address]  Assessment & Plan:  I have refilled her Metformin, Daniel Kil, in addition to a prescription for an SGLT2. I have given her samples of Steglatro. She has 1 month supply of this. She will work with her insurance to determine best coverage of her medications. I have also given her a glucometer which she is comfortable using and will check her sugars at least twice a day. We did discuss the need for increased exercise and weight loss. Discussed monitoring for sugar lows if she drops below 90 she is to eat something and call the office. She is to stop the Daniel Kil if she is routinely running below 120 fasting in the morning. Although I gave her samples of Steglatro I have ordered fark CIGA for her I was very clear that if she should get the fark CIGA filled she needs to stop the [facility] that they are the same class of medications. Verbalized understanding of this and all of the above instructions. She will follow-up with me in 1 month for an A1c and follow-up on meds  Orders:  -     Insulin Degludec 200 UNIT/ML SOPN; Inject 10 Units into the skin nightly, Disp-4 pen, R-3Normal  -     Insulin Pen Needle (KR[employer] PEN NEEDLES 31G) 31G X 8 MM MISC; DAILY Starting Thu 3/4/2021, Disp-100 each, R-3, Normal  -     metFORMIN (GLUCOPHAGE) 1000 MG tablet; Take 1 tablet by mouth 2 times daily (with meals), Disp-180 tablet, R-1Normal  -     dapagliflozin (FARXIGA) 10 MG tablet; Take 1 tablet by mouth every morning, Disp-90 tablet, R-1Normal      Return in about 4 weeks (around 2021). SUBJECTIVE/OBJECTIVE:  Idania Wilks is a 47-year-old female with a history of type 2 diabetes and obesity.   Presents today as follow-up from the emergency room where she was seen for hyperglycemia with blood sugars between 6 and 900. Patient reports that she ran out of Metformin 2 weeks ago and her blood sugars have been high ever since. She was started on Lantus and Metformin back in September 2020 however ran out of the Lantus within a month and did not continue it. I have not seen her since that time. She reports that her blood sugars run in the 170 range when she is on the metformin. She complains of polydipsia, polyuria, blurred vision, leg aches and pains, generalized fatigue. Current Outpatient Medications   Medication Sig Dispense Refill    metFORMIN (GLUCOPHAGE) 500 MG tablet Take 1 tablet by mouth 2 times daily (with meals) 180 tablet 1    Insulin Degludec 200 UNIT/ML SOPN Inject 10 Units into the skin nightly 4 pen 3    Insulin Pen Needle (KR[employer]ER PEN NEEDLES 31G) 31G X 8 MM MISC 1 each by Does not apply route daily 100 each 3    metFORMIN (GLUCOPHAGE) 1000 MG tablet Take 1 tablet by mouth 2 times daily (with meals) 180 tablet 1    dapagliflozin (FARXIGA) 10 MG tablet Take 1 tablet by mouth every morning 90 tablet 1    blood glucose test strips (ONETOUCH VERIO) strip 1 each by In Vitro route 3 times daily As needed. 100 each 3    [employer] Lancets MISC 1 each by Does not apply route daily 100 each 3    Insulin Pen Needle ([employer]ER PEN NEEDLES 31G) 31G X 8 MM MISC 1 each by Does not apply route daily 100 each 3    Alcohol Swabs (ALCOHOL PADS) 70 % PADS 1 box by Does not apply route 4 times daily 1 each 2    Lancets MISC 1 each by Does not apply route 2 times daily 300 each 1    blood glucose monitor strips Test 2 times a day & as needed for symptoms of irregular blood glucose. Dispense sufficient amount for indicated testing frequency plus additional to accommodate PRN testing needs. 100 strip 0    blood glucose monitor strips Test 2 times a day & as needed for symptoms of irregular blood glucose.  Dispense sufficient amount for indicated testing frequency plus additional to accommodate PRN testing needs. 100 strip 0    glipiZIDE-metformin (METAGLIP) 2.5-500 MG per tablet Take 1 tablet by mouth 2 times daily (before meals) 60 tablet 3    glucose monitoring kit (FREESTYLE) monitoring kit 1 kit by Does not apply route daily 1 kit 0    blood glucose monitor strips Test 3 times a day & as needed for symptoms of irregular blood glucose. Dispense sufficient amount for indicated testing frequency plus additional to accommodate PRN testing needs. 90 strip 0    ibuprofen (ADVIL;MOTRIN) 800 MG tablet Take 1 tablet by mouth every 8 hours as needed for Pain or Fever 20 tablet 0     No current facility-administered medications for this visit. Review of Systems   All other systems reviewed and are negative. Vitals:    03/04/21 1554   BP: 122/78   Pulse: 104   Temp: 97.8 °F (36.6 °C)   SpO2: 98%   Weight: 274 lb (124.3 kg)   Height: 5' 9\" (1.753 m)       Physical Exam  Constitutional:       Appearance: She is well-developed. HENT:      Head: Normocephalic. Eyes:      Pupils: Pupils are equal, round, and reactive to light. Neck:      Musculoskeletal: Normal range of motion. Cardiovascular:      Rate and Rhythm: Normal rate and regular rhythm. Heart sounds: Normal heart sounds. Pulmonary:      Effort: Pulmonary effort is normal.      Breath sounds: Normal breath sounds. Musculoskeletal: Normal range of motion. Skin:     General: Skin is warm and dry. Neurological:      Mental Status: She is alert and oriented to person, place, and time. An electronic signature was used to authenticate this note.     --BARBY Cummings - CNP

## 2021-03-04 NOTE — ED PROVIDER NOTES
[address] [facility]          ATTENDING PHYSICIAN NOTE       Date of evaluation: 3/4/2021    Chief Complaint     Dizziness (Diabetic not checking BG. Polydipsia, polyuria)      History of Present Illness     Shen Briggs is a 39 y.o. female with history of type 2 diabetes, currently on Metformin presenting to the emergency department today with dizziness, fatigue, polydipsia, polyuria. Patient states she has lost her glucometer at home and been unable to check her sugars over the last week or so. Most recently recorded sugars at home she states were in the high 200s. She has been relatively nonadherent to a diabetic diet and drinking lots of soda and eating large amounts of carbs and sugar. She is currently on Metformin only for her diabetes, was briefly on long-acting insulin. She denies any fevers, cough, has had some diffuse abdominal pain with nonbloody emesis, no change in bowel movements, has urinary frequency but no dysuria. Review of Systems     Pertinent positive and negative findings as documented in the HPI. Otherwise all other systems were reviewed and were negative. Physical Exam     INITIAL VITALS: BP: 136/81, Temp: 98.7 °F (37.1 °C), Pulse: 102, Resp: 20, SpO2: 97 %     Nursing note and vitals reviewed. General:  Adult female, alert and appropriately interactive. In no distress. HENT: Normocephalic and atraumatic. External ears normal. Nose appears normal externally. Eyes: Conjunctivae normal. No scleral icterus. Neck: Neck supple. No tracheal deviation present. CV: Tachycardic rate. Regular rhythm. Chest: Effort normal on room air. No Kussmaul breathing. Abdominal: Soft. No distension. Mild diffuse tenderness. No rebound or guarding. No masses. No peritoneal signs. Musculoskeletal: No edema. No gross deformities. Neurological: Alert and appropriately interactive. Face symmetric, speech without dysarthria or obvious aphasia.  Moving all extremities spontaneously. Skin: Warm, dry. No rash. No diaphoresis or erythema. Psychiatric: Calm and cooperative with appropriate mood and affect. Procedures   Procedures    MEDICAL DECISION MAKING     MDM: Breezy Araya is a 39 y.o. female with history as above presenting for dizziness, fatigue, and hyperglycemia symptoms. On arrival, patient in no distress, ambulatory with stable gait, but is noted to be tachycardic in the 100s. She is without significantly increased work of breathing, has a mildly tender abdomen without signs of acute abdomen. Symptoms most consistent with hyperglycemia, possible mild DKA. Point-of-care glucose is 597 on arrival.  Patient given IV fluids and labs obtained. EKG is reassuring. Labs are similarly reassuring with mild ketones in the urine, but no significant anion gap or metabolic acidosis. She was given additional IV fluids as well as insulin in the ED. She felt overall improved on reexamination. I discussed with her PCP patient situation, and her PCP is currently in the office and able to get her glucose testing supplies as well as medication samples. A refill of her Metformin was provided. The patient will go directly to her PCPs office for further assistance with managing her diabetes. Discharged in stable condition with written and verbal instructions for which to return to the ED. Clinical Impression     1. Type 2 diabetes mellitus with hyperglycemia, without long-term current use of insulin ([facility])        Disposition     DISPOSITION          Conner Reyes MD  4:43 PM                     Past Medical, Surgical, Family, and Social History     She has no past medical history on file. She has a past surgical history that includes Appendectomy. Her family history includes Asthma in her mother; Diabetes in her mother; Heart Disease in her mother. She reports that she has never smoked.  She has never used smokeless tobacco. She reports that she does not drink alcohol or use drugs. Medications     Discharge Medication List as of 3/4/2021  3:17 PM      CONTINUE these medications which have NOT CHANGED    Details   Insulin Pen Needle ([employer] PEN NEEDLES 31G) 31G X 8 MM MISC DAILY Starting Wed 9/2/2020, Disp-100 each,R-3, Normal      Alcohol Swabs (ALCOHOL PADS) 70 % PADS 4 TIMES DAILY Starting Wed 9/2/2020, Disp-1 each,R-2, Normal      Lancets MISC 2 TIMES DAILY Starting Fri 8/21/2020, Disp-300 each,R-1, Print      !! blood glucose monitor strips Test 2 times a day & as needed for symptoms of irregular blood glucose. Dispense sufficient amount for indicated testing frequency plus additional to accommodate PRN testing needs. , Disp-100 strip,R-0, Print      !! blood glucose monitor strips Test 2 times a day & as needed for symptoms of irregular blood glucose. Dispense sufficient amount for indicated testing frequency plus additional to accommodate PRN testing needs. , Disp-100 strip,R-0, Print      glipiZIDE-metformin (METAGLIP) 2.5-500 MG per tablet Take 1 tablet by mouth 2 times daily (before meals), Disp-60 tablet,R-3Print      glucose monitoring kit (FREESTYLE) monitoring kit DAILY Starting Wed 8/19/2020, Disp-1 kit,R-0, Print      !! blood glucose monitor strips Test 3 times a day & as needed for symptoms of irregular blood glucose. Dispense sufficient amount for indicated testing frequency plus additional to accommodate PRN testing needs. , Disp-90 strip,R-0, Print      ibuprofen (ADVIL;MOTRIN) 800 MG tablet Take 1 tablet by mouth every 8 hours as needed for Pain or Fever, Disp-20 tablet, R-0Print      insulin glargine (LANTUS SOLOSTAR) 100 UNIT/ML injection pen Inject 12 Units into the skin nightly Increase dose as instructed, Disp-5 pen,R-3Normal       !! - Potential duplicate medications found. Please discuss with provider. Allergies     She is allergic to risperidone.     ED Course     Nursing Notes, Past Medical Hx, Past Surgical Hx, Social (L) 99 - 110 mmol/L    CO2 22 21 - 32 mmol/L    Anion Gap 16 3 - 16    Glucose 653 (HH) 70 - 99 mg/dL    BUN 13 7 - 20 mg/dL    CREATININE 1.0 0.6 - 1.1 mg/dL    GFR Non-African American >60 >60    GFR African American >60 >60    Calcium 9.6 8.3 - 10.6 mg/dL   Blood Gas, Venous   Result Value Ref Range    pH, Arthur 7.349 (L) 7.350 - 7.450    pCO2, Arthur 43.5 41.0 - 51.0 mmHg    pO2, Arthur 86.3 (H) 25.0 - 40.0 mmHg    HCO3, Venous 23.9 (L) 24.0 - 28.0 mmol/L    Base Excess, Arthur -1.8 -2.0 - 3.0 mmol/L    O2 Sat, Arthur 97 Not established %    Carboxyhemoglobin 1.0 0.0 - 1.5 %    MetHgb, Arthur 0.4 0.0 - 1.5 %    TC02 (Calc), Arthur 25 mmol/L   Urinalysis Reflex to Culture    Specimen: Urine, clean catch   Result Value Ref Range    Color, UA Yellow Straw/Yellow    Clarity, UA Clear Clear    Glucose, Ur >=1000 (A) Negative mg/dL    Bilirubin Urine Negative Negative    Ketones, Urine 40 (A) Negative mg/dL    Specific Gravity, UA 1.010 1.005 - 1.030    Blood, Urine TRACE-INTACT (A) Negative    pH, UA 5.5 5.0 - 8.0    Protein, UA Negative Negative mg/dL    Urobilinogen, Urine 0.2 <2.0 E.U./dL    Nitrite, Urine Negative Negative    Leukocyte Esterase, Urine Negative Negative    Microscopic Examination YES     Urine Type      Urine Reflex to Culture Not Indicated    Pregnancy, Urine   Result Value Ref Range    HCG(Urine) Pregnancy Test Negative Detects HCG level >20 MIU/mL   Microscopic Urinalysis   Result Value Ref Range    WBC, UA None seen 0 - 5 /HPF    RBC, UA 0-2 0 - 4 /HPF    Epithelial Cells, UA 0-1 0 - 5 /HPF    Bacteria, UA Rare (A) None Seen /HPF   POCT Glucose   Result Value Ref Range    POC Glucose 597 (H) 70 - 99 mg/dl    Performed on ACCU-CHEK    POCT Glucose   Result Value Ref Range    POC Glucose 408 (H) 70 - 99 mg/dl    Performed on ACCU-CHEK    POCT Glucose   Result Value Ref Range    POC Glucose 295 (H) 70 - 99 mg/dl    Performed on ACCU-CHEK    EKG 12 Lead   Result Value Ref Range    Ventricular Rate 95 BPM    Atrial Rate 95 BPM    P-R Interval 142 ms    QRS Duration 76 ms    Q-T Interval 356 ms    QTc Calculation (Bazett) 447 ms    P Axis 60 degrees    R Axis 20 degrees    T Axis 18 degrees    Diagnosis       EKG performed in ER and to be interpreted by ER physician. Confirmed by MD, ER ([id number]),  Alexander Flower ([id number]) on 3/4/2021 11:26:18 AM       CONSULTS:  IP CONSULT TO PRIMARY CARE PROVIDER    PATIENT REFERRED TO:  [facility] Emergency Department  [address] [place] [address]  [phone]    If symptoms worsen    Raymond Carroll, APRN - [address]  [address] [facility] [place] [facility] [place]  [phone]    Go today  for your diabetes supplies      DISCHARGE MEDICATIONS:  Discharge Medication List as of 3/4/2021  3:17 PM      START taking these medications    Details   metFORMIN (GLUCOPHAGE) 500 MG tablet Take 1 tablet by mouth 2 times daily (with meals), Disp-180 tablet, R-1Print              Namrata Pavon MD  03/04/21 1460

## 2021-03-04 NOTE — ED TRIAGE NOTES
Pt is in the ED with dizziness going on for about a week. She also C/O excessive thirst, frequent urination, nausea, vomiting. She is a diabetic but has not taken any medications for 2 weeks.

## 2021-03-04 NOTE — ASSESSMENT & PLAN NOTE
I have refilled her Metformin, [place], in addition to a prescription for an SGLT2. I have given her samples of Steglatro. She has 1 month supply of this. She will work with her insurance to determine best coverage of her medications. I have also given her a glucometer which she is comfortable using and will check her sugars at least twice a day. We did discuss the need for increased exercise and weight loss. Discussed monitoring for sugar lows if she drops below 90 she is to eat something and call the office. She is to stop the [place] if she is routinely running below 120 fasting in the morning. Although I gave her samples of Steglatro I have ordered fark CIGA for her I was very clear that if she should get the fark CIGA filled she needs to stop the [facility] that they are the same class of medications. Verbalized understanding of this and all of the above instructions.   She will follow-up with me in 1 month for an A1c and follow-up on meds

## 2021-03-07 ENCOUNTER — APPOINTMENT (OUTPATIENT)
Dept: GENERAL RADIOLOGY | Age: 46
DRG: 638 | End: 2021-03-07
Payer: COMMERCIAL

## 2021-03-07 ENCOUNTER — HOSPITAL ENCOUNTER (INPATIENT)
Age: 46
LOS: 2 days | Discharge: HOME | DRG: 638 | End: 2021-03-09
Attending: EMERGENCY MEDICINE | Admitting: INTERNAL MEDICINE
Payer: COMMERCIAL

## 2021-03-07 DIAGNOSIS — E11.9 NEW ONSET TYPE 2 DIABETES MELLITUS (HCC): ICD-10-CM

## 2021-03-07 DIAGNOSIS — R11.2 NAUSEA AND VOMITING, INTRACTABILITY OF VOMITING NOT SPECIFIED, UNSPECIFIED VOMITING TYPE: ICD-10-CM

## 2021-03-07 DIAGNOSIS — E11.10 DIABETIC KETOACIDOSIS WITHOUT COMA ASSOCIATED WITH TYPE 2 DIABETES MELLITUS (HCC): Primary | ICD-10-CM

## 2021-03-07 LAB
A/G RATIO: 1.1 (ref 1.1–2.2)
ALBUMIN SERPL-MCNC: 5 G/DL (ref 3.4–5)
ALP BLD-CCNC: 201 U/L (ref 40–129)
ALT SERPL-CCNC: 61 U/L (ref 10–40)
ANION GAP SERPL CALCULATED.3IONS-SCNC: 20 MMOL/L (ref 3–16)
ANION GAP SERPL CALCULATED.3IONS-SCNC: 23 MMOL/L (ref 3–16)
ANION GAP SERPL CALCULATED.3IONS-SCNC: 29 MMOL/L (ref 3–16)
ANION GAP SERPL CALCULATED.3IONS-SCNC: 32 MMOL/L (ref 3–16)
AST SERPL-CCNC: 28 U/L (ref 15–37)
BACTERIA: ABNORMAL /HPF
BASE EXCESS ARTERIAL: -14 (ref -3–3)
BASE EXCESS ARTERIAL: -18 MMOL/L (ref -3–3)
BASE EXCESS VENOUS: -18.4 MMOL/L (ref -3–3)
BASOPHILS ABSOLUTE: 0.1 K/UL (ref 0–0.2)
BASOPHILS RELATIVE PERCENT: 0.5 %
BETA-HYDROXYBUTYRATE: 7.34 MMOL/L (ref 0–0.27)
BILIRUB SERPL-MCNC: 0.7 MG/DL (ref 0–1)
BILIRUBIN URINE: ABNORMAL
BLOOD, URINE: ABNORMAL
BUN BLDV-MCNC: 13 MG/DL (ref 7–20)
BUN BLDV-MCNC: 15 MG/DL (ref 7–20)
BUN BLDV-MCNC: 18 MG/DL (ref 7–20)
BUN BLDV-MCNC: 22 MG/DL (ref 7–20)
CALCIUM SERPL-MCNC: 10.2 MG/DL (ref 8.3–10.6)
CALCIUM SERPL-MCNC: 7.7 MG/DL (ref 8.3–10.6)
CALCIUM SERPL-MCNC: 8 MG/DL (ref 8.3–10.6)
CALCIUM SERPL-MCNC: 8.8 MG/DL (ref 8.3–10.6)
CARBOXYHEMOGLOBIN ARTERIAL: 1.2 % (ref 0–1.5)
CHLORIDE BLD-SCNC: 107 MMOL/L (ref 99–110)
CHLORIDE BLD-SCNC: 109 MMOL/L (ref 99–110)
CHLORIDE BLD-SCNC: 111 MMOL/L (ref 99–110)
CHLORIDE BLD-SCNC: 96 MMOL/L (ref 99–110)
CLARITY: CLEAR
CO2: 10 MMOL/L (ref 21–32)
CO2: 10 MMOL/L (ref 21–32)
CO2: 13 MMOL/L (ref 21–32)
CO2: 8 MMOL/L (ref 21–32)
COLOR: ABNORMAL
CREAT SERPL-MCNC: 1.2 MG/DL (ref 0.6–1.1)
CREAT SERPL-MCNC: 1.3 MG/DL (ref 0.6–1.1)
CREAT SERPL-MCNC: 1.3 MG/DL (ref 0.6–1.1)
CREAT SERPL-MCNC: 1.5 MG/DL (ref 0.6–1.1)
CREATININE URINE: 27.5 MG/DL (ref 28–259)
EOSINOPHILS ABSOLUTE: 0 K/UL (ref 0–0.6)
EOSINOPHILS RELATIVE PERCENT: 0 %
EPITHELIAL CELLS, UA: ABNORMAL /HPF (ref 0–5)
ETHANOL: NORMAL MG/DL (ref 0–0.08)
GFR AFRICAN AMERICAN: 46
GFR AFRICAN AMERICAN: 54
GFR AFRICAN AMERICAN: 54
GFR AFRICAN AMERICAN: 59
GFR NON-AFRICAN AMERICAN: 38
GFR NON-AFRICAN AMERICAN: 45
GFR NON-AFRICAN AMERICAN: 45
GFR NON-AFRICAN AMERICAN: 49
GLOBULIN: 4.5 G/DL
GLUCOSE BLD-MCNC: 106 MG/DL (ref 70–99)
GLUCOSE BLD-MCNC: 110 MG/DL (ref 70–99)
GLUCOSE BLD-MCNC: 113 MG/DL (ref 70–99)
GLUCOSE BLD-MCNC: 121 MG/DL (ref 70–99)
GLUCOSE BLD-MCNC: 133 MG/DL (ref 70–99)
GLUCOSE BLD-MCNC: 136 MG/DL (ref 70–99)
GLUCOSE BLD-MCNC: 156 MG/DL (ref 70–99)
GLUCOSE BLD-MCNC: 157 MG/DL (ref 70–99)
GLUCOSE BLD-MCNC: 165 MG/DL (ref 70–99)
GLUCOSE BLD-MCNC: 166 MG/DL (ref 70–99)
GLUCOSE BLD-MCNC: 176 MG/DL (ref 70–99)
GLUCOSE BLD-MCNC: 178 MG/DL (ref 70–99)
GLUCOSE BLD-MCNC: 178 MG/DL (ref 70–99)
GLUCOSE BLD-MCNC: 196 MG/DL (ref 70–99)
GLUCOSE BLD-MCNC: 220 MG/DL (ref 70–99)
GLUCOSE BLD-MCNC: 312 MG/DL (ref 70–99)
GLUCOSE BLD-MCNC: 361 MG/DL (ref 70–99)
GLUCOSE URINE: >=1000 MG/DL
HCO3 ARTERIAL: 13.5 MMOL/L (ref 21–29)
HCO3 ARTERIAL: 9 MMOL/L (ref 21–29)
HCO3 VENOUS: 10.2 MMOL/L (ref 23–29)
HCT VFR BLD CALC: 48.3 % (ref 36–48)
HEMOGLOBIN, ART, EXTENDED: 13.3 G/DL
HEMOGLOBIN: 14.9 G/DL (ref 12–16)
KETONES, URINE: >=80 MG/DL
LACTIC ACID, SEPSIS: 2.1 MMOL/L (ref 0.4–1.9)
LACTIC ACID, SEPSIS: 2.9 MMOL/L (ref 0.4–1.9)
LACTIC ACID: 1.4 MMOL/L (ref 0.4–2)
LEUKOCYTE ESTERASE, URINE: NEGATIVE
LIPASE: 69 U/L (ref 13–60)
LIPASE: 70 U/L (ref 13–60)
LYMPHOCYTES ABSOLUTE: 1.7 K/UL (ref 1–5.1)
LYMPHOCYTES RELATIVE PERCENT: 6.8 %
MAGNESIUM: 2 MG/DL (ref 1.8–2.4)
MAGNESIUM: 2.1 MG/DL (ref 1.8–2.4)
MAGNESIUM: 2.4 MG/DL (ref 1.8–2.4)
MCH RBC QN AUTO: 23.5 PG (ref 26–34)
MCHC RBC AUTO-ENTMCNC: 30.8 G/DL (ref 31–36)
MCV RBC AUTO: 76.2 FL (ref 80–100)
METHEMOGLOBIN ARTERIAL: 0.9 % (ref 0–1.4)
MICROSCOPIC EXAMINATION: YES
MONOCYTES ABSOLUTE: 1.4 K/UL (ref 0–1.3)
MONOCYTES RELATIVE PERCENT: 5.6 %
MUCUS: ABNORMAL /LPF
NEUTROPHILS ABSOLUTE: 21.5 K/UL (ref 1.7–7.7)
NEUTROPHILS RELATIVE PERCENT: 87.1 %
NITRITE, URINE: NEGATIVE
O2 SAT, ARTERIAL: 97 % (ref 93–100)
O2 SAT, ARTERIAL: 99 % (ref 93–100)
O2 SAT, VEN: 82 %
O2 THERAPY: ABNORMAL
OSMOLALITY: 326 MOSM/KG (ref 278–305)
PCO2 ARTERIAL: 25.6 MMHG (ref 35–45)
PCO2 ARTERIAL: 30.3 MM HG (ref 35–45)
PCO2, VEN: 27.9 MMHG (ref 40–50)
PDW BLD-RTO: 19.4 % (ref 12.4–15.4)
PERFORMED ON: ABNORMAL
PH ARTERIAL: 7.16 (ref 7.35–7.45)
PH ARTERIAL: 7.26 (ref 7.35–7.45)
PH UA: 5 (ref 5–8)
PH VENOUS: 7.17 (ref 7.35–7.45)
PHOSPHORUS: 2 MG/DL (ref 2.5–4.9)
PHOSPHORUS: 3 MG/DL (ref 2.5–4.9)
PHOSPHORUS: 3.9 MG/DL (ref 2.5–4.9)
PLATELET # BLD: 429 K/UL (ref 135–450)
PMV BLD AUTO: 10.9 FL (ref 5–10.5)
PO2 ARTERIAL: 127 MMHG (ref 75–108)
PO2 ARTERIAL: 98 MM HG (ref 75–108)
PO2, VEN: 57.6 MMHG (ref 25–40)
POC SAMPLE TYPE: ABNORMAL
POTASSIUM REFLEX MAGNESIUM: 5.1 MMOL/L (ref 3.5–5.1)
POTASSIUM SERPL-SCNC: 4.6 MMOL/L (ref 3.5–5.1)
POTASSIUM SERPL-SCNC: 4.7 MMOL/L (ref 3.5–5.1)
POTASSIUM SERPL-SCNC: 5.3 MMOL/L (ref 3.5–5.1)
PRO-BNP: <5 PG/ML (ref 0–124)
PROTEIN UA: ABNORMAL MG/DL
RBC # BLD: 6.33 M/UL (ref 4–5.2)
RBC UA: ABNORMAL /HPF (ref 0–4)
SALICYLATE, SERUM: <0.3 MG/DL (ref 15–30)
SODIUM BLD-SCNC: 138 MMOL/L (ref 136–145)
SODIUM BLD-SCNC: 142 MMOL/L (ref 136–145)
SODIUM BLD-SCNC: 144 MMOL/L (ref 136–145)
SODIUM BLD-SCNC: 144 MMOL/L (ref 136–145)
SODIUM URINE: 75 MMOL/L
SPECIFIC GRAVITY UA: 1.02 (ref 1–1.03)
TCO2 ARTERIAL: 10 MMOL/L
TCO2 ARTERIAL: 15 MMOL/L
TCO2 CALC VENOUS: 11 MMOL/L
TOTAL PROTEIN: 9.5 G/DL (ref 6.4–8.2)
TROPONIN: <0.01 NG/ML
URINE TYPE: ABNORMAL
UROBILINOGEN, URINE: 0.2 E.U./DL
WBC # BLD: 24.7 K/UL (ref 4–11)
WBC UA: ABNORMAL /HPF (ref 0–5)
YEAST: PRESENT /HPF

## 2021-03-07 PROCEDURE — 71046 X-RAY EXAM CHEST 2 VIEWS: CPT

## 2021-03-07 PROCEDURE — 85025 COMPLETE CBC W/AUTO DIFF WBC: CPT

## 2021-03-07 PROCEDURE — 83880 ASSAY OF NATRIURETIC PEPTIDE: CPT

## 2021-03-07 PROCEDURE — 36415 COLL VENOUS BLD VENIPUNCTURE: CPT

## 2021-03-07 PROCEDURE — 82077 ASSAY SPEC XCP UR&BREATH IA: CPT

## 2021-03-07 PROCEDURE — 74018 RADEX ABDOMEN 1 VIEW: CPT

## 2021-03-07 PROCEDURE — 2580000003 HC RX 258: Performed by: EMERGENCY MEDICINE

## 2021-03-07 PROCEDURE — 6360000002 HC RX W HCPCS: Performed by: STUDENT IN AN ORGANIZED HEALTH CARE EDUCATION/TRAINING PROGRAM

## 2021-03-07 PROCEDURE — 83605 ASSAY OF LACTIC ACID: CPT

## 2021-03-07 PROCEDURE — 36600 WITHDRAWAL OF ARTERIAL BLOOD: CPT

## 2021-03-07 PROCEDURE — 82803 BLOOD GASES ANY COMBINATION: CPT

## 2021-03-07 PROCEDURE — 82570 ASSAY OF URINE CREATININE: CPT

## 2021-03-07 PROCEDURE — 84484 ASSAY OF TROPONIN QUANT: CPT

## 2021-03-07 PROCEDURE — 84300 ASSAY OF URINE SODIUM: CPT

## 2021-03-07 PROCEDURE — 6360000002 HC RX W HCPCS: Performed by: EMERGENCY MEDICINE

## 2021-03-07 PROCEDURE — 2000000000 HC ICU R&B

## 2021-03-07 PROCEDURE — 80179 DRUG ASSAY SALICYLATE: CPT

## 2021-03-07 PROCEDURE — 82010 KETONE BODYS QUAN: CPT

## 2021-03-07 PROCEDURE — 6370000000 HC RX 637 (ALT 250 FOR IP): Performed by: EMERGENCY MEDICINE

## 2021-03-07 PROCEDURE — 81001 URINALYSIS AUTO W/SCOPE: CPT

## 2021-03-07 PROCEDURE — 2580000003 HC RX 258: Performed by: STUDENT IN AN ORGANIZED HEALTH CARE EDUCATION/TRAINING PROGRAM

## 2021-03-07 PROCEDURE — 87040 BLOOD CULTURE FOR BACTERIA: CPT

## 2021-03-07 PROCEDURE — 80053 COMPREHEN METABOLIC PANEL: CPT

## 2021-03-07 PROCEDURE — 83036 HEMOGLOBIN GLYCOSYLATED A1C: CPT

## 2021-03-07 PROCEDURE — 2500000003 HC RX 250 WO HCPCS: Performed by: STUDENT IN AN ORGANIZED HEALTH CARE EDUCATION/TRAINING PROGRAM

## 2021-03-07 PROCEDURE — 83690 ASSAY OF LIPASE: CPT

## 2021-03-07 PROCEDURE — 93005 ELECTROCARDIOGRAM TRACING: CPT | Performed by: EMERGENCY MEDICINE

## 2021-03-07 PROCEDURE — 83930 ASSAY OF BLOOD OSMOLALITY: CPT

## 2021-03-07 PROCEDURE — 84100 ASSAY OF PHOSPHORUS: CPT

## 2021-03-07 PROCEDURE — 99284 EMERGENCY DEPT VISIT MOD MDM: CPT

## 2021-03-07 PROCEDURE — 6370000000 HC RX 637 (ALT 250 FOR IP): Performed by: STUDENT IN AN ORGANIZED HEALTH CARE EDUCATION/TRAINING PROGRAM

## 2021-03-07 PROCEDURE — 82947 ASSAY GLUCOSE BLOOD QUANT: CPT

## 2021-03-07 PROCEDURE — 83735 ASSAY OF MAGNESIUM: CPT

## 2021-03-07 RX ORDER — DEXTROSE MONOHYDRATE 25 G/50ML
12.5 INJECTION, SOLUTION INTRAVENOUS PRN
Status: DISCONTINUED | OUTPATIENT
Start: 2021-03-07 | End: 2021-03-09 | Stop reason: HOSPADM

## 2021-03-07 RX ORDER — ONDANSETRON 2 MG/ML
4 INJECTION INTRAMUSCULAR; INTRAVENOUS ONCE
Status: COMPLETED | OUTPATIENT
Start: 2021-03-07 | End: 2021-03-07

## 2021-03-07 RX ORDER — NICOTINE POLACRILEX 4 MG
15 LOZENGE BUCCAL PRN
Status: DISCONTINUED | OUTPATIENT
Start: 2021-03-07 | End: 2021-03-09 | Stop reason: HOSPADM

## 2021-03-07 RX ORDER — FLUCONAZOLE 150 MG/1
150 TABLET ORAL ONCE
Status: COMPLETED | OUTPATIENT
Start: 2021-03-07 | End: 2021-03-07

## 2021-03-07 RX ORDER — 0.9 % SODIUM CHLORIDE 0.9 %
1000 INTRAVENOUS SOLUTION INTRAVENOUS ONCE
Status: COMPLETED | OUTPATIENT
Start: 2021-03-07 | End: 2021-03-07

## 2021-03-07 RX ORDER — DEXTROSE MONOHYDRATE 25 G/50ML
12.5 INJECTION, SOLUTION INTRAVENOUS PRN
Status: DISCONTINUED | OUTPATIENT
Start: 2021-03-07 | End: 2021-03-07 | Stop reason: SDUPTHER

## 2021-03-07 RX ORDER — HEPARIN SODIUM 5000 [USP'U]/ML
5000 INJECTION, SOLUTION INTRAVENOUS; SUBCUTANEOUS EVERY 8 HOURS SCHEDULED
Status: DISCONTINUED | OUTPATIENT
Start: 2021-03-07 | End: 2021-03-07

## 2021-03-07 RX ORDER — DEXTROSE MONOHYDRATE 50 MG/ML
100 INJECTION, SOLUTION INTRAVENOUS PRN
Status: DISCONTINUED | OUTPATIENT
Start: 2021-03-07 | End: 2021-03-09 | Stop reason: HOSPADM

## 2021-03-07 RX ORDER — POTASSIUM CHLORIDE 7.45 MG/ML
10 INJECTION INTRAVENOUS PRN
Status: DISCONTINUED | OUTPATIENT
Start: 2021-03-07 | End: 2021-03-09 | Stop reason: HOSPADM

## 2021-03-07 RX ORDER — 0.9 % SODIUM CHLORIDE 0.9 %
15 INTRAVENOUS SOLUTION INTRAVENOUS ONCE
Status: COMPLETED | OUTPATIENT
Start: 2021-03-07 | End: 2021-03-07

## 2021-03-07 RX ORDER — MAGNESIUM SULFATE 1 G/100ML
1000 INJECTION INTRAVENOUS PRN
Status: DISCONTINUED | OUTPATIENT
Start: 2021-03-07 | End: 2021-03-09 | Stop reason: HOSPADM

## 2021-03-07 RX ORDER — SODIUM CHLORIDE 9 MG/ML
INJECTION, SOLUTION INTRAVENOUS CONTINUOUS
Status: DISCONTINUED | OUTPATIENT
Start: 2021-03-07 | End: 2021-03-08

## 2021-03-07 RX ORDER — DEXTROSE, SODIUM CHLORIDE, AND POTASSIUM CHLORIDE 5; .45; .15 G/100ML; G/100ML; G/100ML
INJECTION INTRAVENOUS CONTINUOUS PRN
Status: DISCONTINUED | OUTPATIENT
Start: 2021-03-07 | End: 2021-03-09

## 2021-03-07 RX ORDER — HEPARIN SODIUM 5000 [USP'U]/ML
7500 INJECTION, SOLUTION INTRAVENOUS; SUBCUTANEOUS EVERY 8 HOURS SCHEDULED
Status: DISCONTINUED | OUTPATIENT
Start: 2021-03-07 | End: 2021-03-08

## 2021-03-07 RX ORDER — SODIUM CHLORIDE 9 MG/ML
1000 INJECTION, SOLUTION INTRAVENOUS ONCE
Status: DISCONTINUED | OUTPATIENT
Start: 2021-03-07 | End: 2021-03-07

## 2021-03-07 RX ADMIN — SODIUM CHLORIDE 1000 ML: 9 INJECTION, SOLUTION INTRAVENOUS at 11:02

## 2021-03-07 RX ADMIN — SODIUM CHLORIDE 1000 ML: 9 INJECTION, SOLUTION INTRAVENOUS at 17:26

## 2021-03-07 RX ADMIN — SODIUM CHLORIDE 12.43 UNITS/HR: 900 INJECTION INTRAVENOUS at 11:03

## 2021-03-07 RX ADMIN — SODIUM CHLORIDE 2 UNITS/HR: 900 INJECTION, SOLUTION INTRAVENOUS at 16:19

## 2021-03-07 RX ADMIN — ONDANSETRON 4 MG: 2 INJECTION INTRAMUSCULAR; INTRAVENOUS at 13:10

## 2021-03-07 RX ADMIN — HEPARIN SODIUM 7500 UNITS: 5000 INJECTION INTRAVENOUS; SUBCUTANEOUS at 21:45

## 2021-03-07 RX ADMIN — FLUCONAZOLE 150 MG: 150 TABLET ORAL at 11:38

## 2021-03-07 RX ADMIN — SODIUM CHLORIDE 1000 ML: 9 INJECTION, SOLUTION INTRAVENOUS at 20:00

## 2021-03-07 RX ADMIN — SODIUM CHLORIDE 1865 ML: 9 INJECTION, SOLUTION INTRAVENOUS at 13:27

## 2021-03-07 RX ADMIN — POTASSIUM CHLORIDE, DEXTROSE MONOHYDRATE AND SODIUM CHLORIDE: 150; 5; 450 INJECTION, SOLUTION INTRAVENOUS at 13:10

## 2021-03-07 RX ADMIN — SODIUM BICARBONATE: 84 INJECTION, SOLUTION INTRAVENOUS at 18:49

## 2021-03-07 RX ADMIN — POTASSIUM CHLORIDE, DEXTROSE MONOHYDRATE AND SODIUM CHLORIDE: 150; 5; 450 INJECTION, SOLUTION INTRAVENOUS at 21:34

## 2021-03-07 RX ADMIN — SODIUM CHLORIDE 1000 ML: 9 INJECTION, SOLUTION INTRAVENOUS at 16:19

## 2021-03-07 RX ADMIN — PIPERACILLIN AND TAZOBACTAM 3375 MG: 3; .375 INJECTION, POWDER, LYOPHILIZED, FOR SOLUTION INTRAVENOUS at 17:26

## 2021-03-07 RX ADMIN — SODIUM CHLORIDE 1000 ML: 9 INJECTION, SOLUTION INTRAVENOUS at 14:45

## 2021-03-07 ASSESSMENT — ENCOUNTER SYMPTOMS
SHORTNESS OF BREATH: 1
EYES NEGATIVE: 1
ABDOMINAL PAIN: 1
VOMITING: 1
NAUSEA: 1
ALLERGIC/IMMUNOLOGIC NEGATIVE: 1

## 2021-03-07 ASSESSMENT — PAIN SCALES - GENERAL: PAINLEVEL_OUTOF10: 0

## 2021-03-07 NOTE — LETTER
[facility] ICU  [address] [address]  Phone: [phone]        March 9, 2021     Patient: Magdalena Germain   YOB: 1975   Date of Visit: 3/7/2021       To Whom It May Concern: It is my medical opinion that Ermias Nelson may return to full duty on Monday (March 15, 2021) with no restrictions. She was recently admitted and discharged from the ICU. If you have any questions or concerns, please don't hesitate to call.     Sincerely,        Yuli Weinberg MD  3/9/21  1:20 PM

## 2021-03-07 NOTE — ED NOTES
Patient report given to Jase at [facility] ICU, patient to transfer to room 4522, report given to [facility] ems, patient updated with transfer information personal belongings packed.      Karolina Bansal RN  03/07/21 114

## 2021-03-07 NOTE — ED PROVIDER NOTES
CRITICAL CARE NOTE  There was a high probability of clinical significant / life threatening deterioration in the patient's condition which required my urgent intervention. Total critical care time was at least 32  minutes excluding any separately reported procedures. TRIAGE CHIEF COMPLAINT:   Chief Complaint   Patient presents with    Shortness of Breath    Dizziness       HPI: Sanju Arredondo is a 39 y.o. female who presents to the emergency department with complaint of several week history of dizziness and fatigue. She felt like she might pass out at work yesterday. She has had polydipsia and polyuria. Complains of shortness of breath. No chest pain. She has had nausea and vomiting for the past 2 days without diarrhea. Denies abdominal pain. She has no dysuria or hematuria. Denies flank pain. She denies cough or URI symptoms. She is unsure about fever but denies chills. Patient has a history of type 2 diabetes and obesity. She was seen at [facility] ED on March 4 with complaints of dizziness, increased thirst and polyuria. She had not been using her diabetes medication (Metformin) for 2 weeks. Her blood sugar was 597 but she had no anion gap. She was treated with IV fluids and insulin. Work-up was otherwise negative. She was discharged home with a prescription for Metformin. She states she did get the prescription but has not been taking it every day. Yesterday she saw primary care doctor who prescribed insulin for her and gave her some samples but her mother told her not to use the insulin or Metformin because she was having nausea and vomiting. He has no previous history of DKA. REVIEW OF SYSTEMS:   10 systems reviewed. Pertinent positives per HPI. Otherwise noted to be negative. I have reviewed the triage/nursing documentation and agree unless otherwise noted below.     PAST MEDICAL HISTORY:   Past Medical History:   Diagnosis Date    Diabetes mellitus ([facility] [address]         CURRENT Medications    No medications on file   Discontinued Medications    No medications on file        SURGICAL HISTORY:       Procedure Laterality Date    APPENDECTOMY          FAMILY HISTORY:       Problem Relation Age of Onset    Asthma Mother     Diabetes Mother     Heart Disease Mother         SOCIAL HISTORY:    reports that she has never smoked. She has never used smokeless tobacco. She reports that she does not drink alcohol or use drugs. ALLERGIES:   Allergies   Allergen Reactions    Risperidone Other (See Comments)     Hand tremors and tongue tremor, rigidity       PHYSICAL EXAM:  VITAL SIGNS: /80   Pulse 129   Temp 97.7 °F (36.5 °C) (Oral)   Resp 24   Ht 5' 9\" (1.753 m)   Wt 274 lb (124.3 kg)   SpO2 98%   BMI 40.46 kg/m²   Constitutional:  No acute distress, Non-toxic appearance. Not pale or diaphoretic. No respiratory distress. HENT: Normocephalic, Atraumatic Oropharynx slightly dry, No oral exudates. TMs are normal.  Eyes:  PERRL, EOMI, Conjunctiva normal, No discharge. Neck: No tenderness, Supple, No lymphadenopathy, No stridor. No meningismus. Cardiovascular:  Normal heart rate, Normal rhythm, No murmurs, No rubs, No gallops. Pulmonary/Chest:  Normal breath sounds, No respiratory distress, No wheezing or rales. Abdomen:   Soft, with bowel sounds present. Mild generalized tenderness to palpation noted. No localizing tenderness. No masses, No pulsatile masses  Back:  No tenderness, No CVA tenderness  Extremities:  Normal range of motion, Intact distal pulses, No edema, No tenderness  Neurologic:  Alert & oriented x 3, Speech is clear and appropriate, No upper extremity drift or lower extremity weakness,  Normal sensory function, No facial asymmetry, no truncal or extremity ataxia. Normal gait. Skin:  Warm, Dry, No erythema, No rash  Psychiatric:  Affect normal, Mood normal      EKG:    EKG interpreted by myself. Sinus tachycardia at a rate of 121. Axis is 40 degrees. Nonspecific ST flattening noted. No evidence for ischemia. Intervals are normal.  QTC is 497. Radiology:  XR CHEST (2 VW)   Final Result      No acute disease.              LAB  Labs Reviewed   COMPREHENSIVE METABOLIC PANEL W/ REFLEX TO MG FOR LOW K - Abnormal; Notable for the following components:       Result Value    Chloride 96 (*)     CO2 10 (*)     Anion Gap 32 (*)     Glucose 361 (*)     BUN 22 (*)     CREATININE 1.5 (*)     GFR Non- 38 (*)     GFR  46 (*)     Total Protein 9.5 (*)     Alkaline Phosphatase 201 (*)     ALT 61 (*)     All other components within normal limits    Narrative:     CALL  [facility] tel. [phone],  Chemistry results called to and read back by Farzad Moreno RN, 03/07/2021 10:08,  by [facility]  Performed at:  [facility]  [place]   Phone [phone]   CBC WITH AUTO DIFFERENTIAL - Abnormal; Notable for the following components:    WBC 24.7 (*)     RBC 6.33 (*)     Hematocrit 48.3 (*)     MCV 76.2 (*)     MCH 23.5 (*)     MCHC 30.8 (*)     RDW 19.4 (*)     MPV 10.9 (*)     Neutrophils Absolute 21.5 (*)     Monocytes Absolute 1.4 (*)     All other components within normal limits    Narrative:     Performed at:  [facility]  [place]   Phone [phone]   BLOOD GAS, VENOUS - Abnormal; Notable for the following components:    pH, Arthur 7.169 (*)     pCO2, Arthur 27.9 (*)     pO2, Arthur 57.6 (*)     HCO3, Venous 10.2 (*)     Base Excess, Arthur -18.4 (*)     All other components within normal limits    Narrative:     [address] [id number],  Chemistry results called to and read back by Farzad Moreno RN, 03/07/2021 10:00,  by [facility]  Performed at:  [facility]  [place]   Phone [phone]   POCT GLUCOSE - Abnormal; Notable for the following components:    POC Glucose 312 (*)     All other components within normal limits    Narrative:     Performed at:  [facility]) [facility]  [address],  [place]   Phone [phone]   CULTURE, BLOOD 1   CULTURE, BLOOD 2   URINALYSIS   LACTATE, SEPSIS   LACTATE, SEPSIS   POCT GLUCOSE   POCT GLUCOSE   POCT GLUCOSE   POCT GLUCOSE   POCT GLUCOSE   POCT GLUCOSE   POCT GLUCOSE   POCT GLUCOSE   POCT GLUCOSE   POCT GLUCOSE   POCT GLUCOSE   POCT GLUCOSE   POCT GLUCOSE   POCT GLUCOSE       ED COURSE & MEDICAL DECISION MAKING:  Pertinent Labs & Imaging studies reviewed. (See chart for details)  61-year-old female with history of type 2 diabetes, no previous history of DKA, seen at [facility] ED on March 4 with complaints of polyuria, increased thirst, fatigue and dizziness. Diagnosed at that time with hyperglycemia without ketosis. Treated with IV fluids and insulin and discharged with Metformin states her symptoms have persisted and that she now has nausea and vomiting for the past 2 days without diarrhea or abdominal pain. No cough or URI symptoms. No dysuria or hematuria. She has been taking her Metformin but not every day. Yesterday she saw a new primary care doctor who prescribed insulin but she did not start it yet. She is afebrile with heart rate 129, respirate 24 and blood pressure 127/80 with normal room air O2 sat. Mucous membranes appear mildly dry. No respiratory distress. She has mild diffuse abdominal tenderness with no localizing tenderness. IV saline was started. EKG shows sinus tachycardia with no ischemia or arrhythmia. Chest x-ray was read as a negative study by the radiologist on duty and reviewed by myself. WBC was 24.7 with left shift. Hemoglobin was 14.9. CMP was remarkable for potassium 5.1, anion gap 32, bicarb 10 and blood sugar 361. BUN was 22 and creatinine 1.5. Alk phos was 201 with ALT 61.   Liver function tests were otherwise normal.  Lipase was 70. VBG shows pH 7.17, PCO2 28 and PO2 58. Lactic acid was 2.9. Blood cultures were obtained. Patient was given Zofran for nausea. Insulin DKA infusion was started. Urinalysis shows glucose greater than 1000 with ketones greater than 80 and negative microscopic with the exception of yeast.  She was given a dose of Diflucan. Case was discussed with the hospitalist at [address] [place] [address], Dr. Wilfred Pena at 11:04 AM and the patient was accepted for admission to ICU inpatient. Patient is agreeable to admission and stable for transfer.             (Please note that portions of this note may have been completed with a voice recognition program.  Efforts were made to edit the dictation but occasionally words are mis-transcribed)      FINAL IMPRESSION:  1 --diabetic ketoacidosis  2 --nausea with vomiting               Vincent Valverde MD  03/07/21 9807

## 2021-03-07 NOTE — H&P
ICU HISTORY AND PHYSICAL       Hospital Day:   ICU Day:                                                          Code:Prior  Admit Date: 3/7/2021  PCP: BARBY Nicholson CNP                                  CC: SOB    HISTORY OF PRESENT ILLNESS:   Patient is a 68-year-old female with past medical history of obesity and diabetes mellitus presented to the [facility] ED with complaints of dizziness and fatigue that have been going on for several weeks. Patient reports that she felt like passing out at work yesterday and has been having symptoms of polydipsia and polyuria. Patient also endorses shortness of breath along with the symptoms but does not report any chest pain or any URI-like symptoms. She has had nausea and vomiting for the past 2 days without any diarrhea. Denies any abdominal pain, dysuria, hematuria, flank pain. EKG at [place]: Sinus tachycardia at a rate of 121. Axis is 40 degrees. Nonspecific ST flattening noted. No evidence for ischemia. Intervals are normal.  QTC is 497. Of note patient was recently seen at [address] [place] [address] ED on March 4 with complaints of dizziness polydipsia and polyuria. At that time she had not been using her Metformin for 2 weeks. Blood sugar was at 597 but no anion gap. Patient was treated with IV fluids and insulin. She was discharged home with prescription for Metformin. Patient reports that she was not taking Metformin daily as prescribed. She saw her primary care provider yesterday who prescribed her insulin with did not use insulin or Metformin secondary to nausea and vomiting. Patient has never had a history of DKA.       PAST HISTORY:     Past Medical History:   Diagnosis Date    Diabetes mellitus ([address]        Past Surgical History:   Procedure Laterality Date    APPENDECTOMY         SocialHistory:   The patient lives at [facility]    Alcohol: denies  Illicit drugs: no use  Tobacco:  denies     Family History:  Family History   Problem Relation Age of Onset    needed for symptoms of irregular blood glucose. Dispense sufficient amount for indicated testing frequency plus additional to accommodate PRN testing needs. 90 strip 0    ibuprofen (ADVIL;MOTRIN) 800 MG tablet Take 1 tablet by mouth every 8 hours as needed for Pain or Fever 20 tablet 0         Scheduled Meds:   ondansetron  4 mg Intravenous Once    sodium chloride  1,000 mL Intravenous Once      Continuous Infusions:   sodium chloride Stopped (03/07/21 0946)    dextrose      insulin 12.43 Units/hr (03/07/21 1103)     PRN Meds:glucose, dextrose, glucagon (rDNA), dextrose    Allergies: Allergies   Allergen Reactions    Risperidone Other (See Comments)     Hand tremors and tongue tremor, rigidity       REVIEW OF SYSTEMS:       History obtained from the patient    Review of Systems   Constitutional: Positive for fatigue. HENT: Negative. Eyes: Negative. Respiratory: Positive for shortness of breath. Cardiovascular: Negative. Gastrointestinal: Positive for abdominal pain, nausea and vomiting. Endocrine: Positive for polydipsia. Genitourinary: Positive for frequency. Musculoskeletal: Negative. Allergic/Immunologic: Negative. Neurological: Positive for dizziness. Hematological: Negative. Psychiatric/Behavioral: Negative. PHYSICAL EXAM:       Vitals: BP (!) 142/69   Pulse 131   Temp 97.7 °F (36.5 °C) (Oral)   Resp 19   Ht 5' 9\" (1.753 m)   Wt 274 lb (124.3 kg)   SpO2 100%   BMI 40.46 kg/m²     I/O:  No intake or output data in the 24 hours ending 03/07/21 1201  No intake/output data recorded. No intake/output data recorded. Physical Examination:     Physical Exam  Constitutional:       Appearance: Normal appearance. She is obese. HENT:      Head: Normocephalic and atraumatic. Mouth/Throat:      Mouth: Mucous membranes are dry. Eyes:      Extraocular Movements: Extraocular movements intact. Pupils: Pupils are equal, round, and reactive to light.    Neck: Musculoskeletal: Normal range of motion. Cardiovascular:      Rate and Rhythm: Regular rhythm. Tachycardia present. Pulses: Normal pulses. Heart sounds: Normal heart sounds. Pulmonary:      Effort: Pulmonary effort is normal.      Breath sounds: Normal breath sounds. Abdominal:      Palpations: Abdomen is soft. Tenderness: There is abdominal tenderness. There is no rebound. Musculoskeletal: Normal range of motion. Skin:     General: Skin is dry. Neurological:      General: No focal deficit present. Mental Status: She is alert and oriented to person, place, and time. Psychiatric:         Mood and Affect: Mood normal.         Behavior: Behavior normal.       No results for input(s): PHART, JWW0TPB, PO2ART in the last 72 hours. DATA:       Labs:  CBC:   Recent Labs     03/07/21  0944   WBC 24.7*   HGB 14.9   HCT 48.3*          BMP:    Recent Labs     03/07/21  0950      K 5.1   CL 96*   CO2 10*   BUN 22*   CREATININE 1.5*   GLUCOSE 361*     LFT's:   Recent Labs     03/07/21  0950   AST 28   ALT 61*   BILITOT 0.7   ALKPHOS 201*     Troponin: No results for input(s): TROPONINI in the last 72 hours. BNP:No results for input(s): BNP in the last 72 hours. ABGs: No results for input(s): PHART, GLR6EDK, PO2ART in the last 72 hours. INR: No results for input(s): INR in the last 72 hours. U/A:  Recent Labs     03/07/21  1056   COLORU [place]raw   PHUR 5.0   WBCUA 0-2   RBCUA 0-2   MUCUS Rare*   YEAST Present*   BACTERIA Rare*   CLARITYU Clear   SPECGRAV 1.025   LEUKOCYTESUR Negative   UROBILINOGEN 0.2   BILIRUBINUR SMALL*   BLOODU MODERATE*   GLUCOSEU >=1000*       XR CHEST (2 VW)   Final Result      No acute disease. EKG:   Echo:  Micro:     ASSESSMENT AND PLAN:   Viet Short is a 39 y.o. female, w/ pmhx of T2DM and obesity presenting with DKA.     DKA 2/2 uncontrolled DMT2  Patient endorses not being able to afford her insulin , not taking her metformin daily. Patient reports being diagnosed 6 months ago. Presented with N/V, polyuria ,polydypsia, Urinalysis shows glucose greater than 1000 with ketones greater than 80 and negative . Blood sugar 361 Anion gap 32   - q4h BMP  - glucose checks  - insulin gtt, DKA protocol   - monitor AG , currently 32  - f/u EKG  - IVF   - Patient got a dose of Diflucan for yeast on UA    - f/u osmolality ,ethanol, Salicylate   - f/u trops and BNP   - F/u ABG    DANNA likely pre-renal   Cr. At 1.5 , baseline appears to be at 1, patient appears volume dry  - IVF  - monitor UOP   - Strict I&Os  - f/u urine studies     Leukocytosis   Sirs Positive Leukocytosis & Tachycardic   WBC at 24.7, patient afebrile, will r/o infectious etiology, Patient does endorse some RUQ pain and diffuse abdominal pain    -F/u Blood cultures  -If patient spikes fever, consider broad abx   -F/u EKG  -UA not suggestive of UTI   -started zosyn   -RUQ US   - f/u KUB     Obesity    - Complicating assessment and treatment.         Code Status:Prior  FEN: NPO  PPX: Heparin subq  DISPO: ICU    This patient has been staffed and discussed with Ilsa Vera MD.   -----------------------------  Saira Fisher MD, PGY-1  3/7/2021  12:01 PM

## 2021-03-07 NOTE — ED NOTES
Patient to ed with complaints of feeling tired, dizzy, dry mouth and sob which has been on going for at least a week. Patient was seen at [facility] for the same and has not been taking her medications.      Chely Lincoln RN  03/07/21 9869

## 2021-03-08 ENCOUNTER — APPOINTMENT (OUTPATIENT)
Dept: ULTRASOUND IMAGING | Age: 46
DRG: 638 | End: 2021-03-08
Payer: COMMERCIAL

## 2021-03-08 LAB
ANION GAP SERPL CALCULATED.3IONS-SCNC: 13 MMOL/L (ref 3–16)
ANION GAP SERPL CALCULATED.3IONS-SCNC: 15 MMOL/L (ref 3–16)
ANION GAP SERPL CALCULATED.3IONS-SCNC: 17 MMOL/L (ref 3–16)
ANION GAP SERPL CALCULATED.3IONS-SCNC: 19 MMOL/L (ref 3–16)
ANION GAP SERPL CALCULATED.3IONS-SCNC: 19 MMOL/L (ref 3–16)
ANION GAP SERPL CALCULATED.3IONS-SCNC: 20 MMOL/L (ref 3–16)
BASOPHILS ABSOLUTE: 0.1 K/UL (ref 0–0.2)
BASOPHILS RELATIVE PERCENT: 0.8 %
BUN BLDV-MCNC: 10 MG/DL (ref 7–20)
BUN BLDV-MCNC: 12 MG/DL (ref 7–20)
BUN BLDV-MCNC: 5 MG/DL (ref 7–20)
BUN BLDV-MCNC: 6 MG/DL (ref 7–20)
BUN BLDV-MCNC: 8 MG/DL (ref 7–20)
BUN BLDV-MCNC: 9 MG/DL (ref 7–20)
CALCIUM SERPL-MCNC: 8 MG/DL (ref 8.3–10.6)
CALCIUM SERPL-MCNC: 8 MG/DL (ref 8.3–10.6)
CALCIUM SERPL-MCNC: 8.3 MG/DL (ref 8.3–10.6)
CALCIUM SERPL-MCNC: 8.5 MG/DL (ref 8.3–10.6)
CALCIUM SERPL-MCNC: 8.6 MG/DL (ref 8.3–10.6)
CALCIUM SERPL-MCNC: 8.7 MG/DL (ref 8.3–10.6)
CHLORIDE BLD-SCNC: 105 MMOL/L (ref 99–110)
CHLORIDE BLD-SCNC: 106 MMOL/L (ref 99–110)
CHLORIDE BLD-SCNC: 112 MMOL/L (ref 99–110)
CHLORIDE BLD-SCNC: 112 MMOL/L (ref 99–110)
CHLORIDE BLD-SCNC: 113 MMOL/L (ref 99–110)
CHLORIDE BLD-SCNC: 113 MMOL/L (ref 99–110)
CO2: 15 MMOL/L (ref 21–32)
CO2: 17 MMOL/L (ref 21–32)
CO2: 18 MMOL/L (ref 21–32)
CO2: 19 MMOL/L (ref 21–32)
CO2: 20 MMOL/L (ref 21–32)
CO2: 21 MMOL/L (ref 21–32)
CREAT SERPL-MCNC: 0.9 MG/DL (ref 0.6–1.1)
CREAT SERPL-MCNC: 0.9 MG/DL (ref 0.6–1.1)
CREAT SERPL-MCNC: 1.1 MG/DL (ref 0.6–1.1)
EKG ATRIAL RATE: 121 BPM
EKG ATRIAL RATE: 90 BPM
EKG DIAGNOSIS: NORMAL
EKG DIAGNOSIS: NORMAL
EKG P AXIS: 64 DEGREES
EKG P AXIS: 69 DEGREES
EKG P-R INTERVAL: 128 MS
EKG P-R INTERVAL: 136 MS
EKG Q-T INTERVAL: 350 MS
EKG Q-T INTERVAL: 374 MS
EKG QRS DURATION: 76 MS
EKG QRS DURATION: 78 MS
EKG QTC CALCULATION (BAZETT): 457 MS
EKG QTC CALCULATION (BAZETT): 497 MS
EKG R AXIS: 26 DEGREES
EKG R AXIS: 40 DEGREES
EKG T AXIS: 28 DEGREES
EKG T AXIS: 60 DEGREES
EKG VENTRICULAR RATE: 121 BPM
EKG VENTRICULAR RATE: 90 BPM
EOSINOPHILS ABSOLUTE: 0 K/UL (ref 0–0.6)
EOSINOPHILS RELATIVE PERCENT: 0.3 %
ESTIMATED AVERAGE GLUCOSE: 306.3 MG/DL
GFR AFRICAN AMERICAN: >60
GFR NON-AFRICAN AMERICAN: 54
GFR NON-AFRICAN AMERICAN: >60
GFR NON-AFRICAN AMERICAN: >60
GLUCOSE BLD-MCNC: 111 MG/DL (ref 70–99)
GLUCOSE BLD-MCNC: 125 MG/DL (ref 70–99)
GLUCOSE BLD-MCNC: 129 MG/DL (ref 70–99)
GLUCOSE BLD-MCNC: 149 MG/DL (ref 70–99)
GLUCOSE BLD-MCNC: 150 MG/DL (ref 70–99)
GLUCOSE BLD-MCNC: 150 MG/DL (ref 70–99)
GLUCOSE BLD-MCNC: 151 MG/DL (ref 70–99)
GLUCOSE BLD-MCNC: 151 MG/DL (ref 70–99)
GLUCOSE BLD-MCNC: 152 MG/DL (ref 70–99)
GLUCOSE BLD-MCNC: 152 MG/DL (ref 70–99)
GLUCOSE BLD-MCNC: 155 MG/DL (ref 70–99)
GLUCOSE BLD-MCNC: 159 MG/DL (ref 70–99)
GLUCOSE BLD-MCNC: 170 MG/DL (ref 70–99)
GLUCOSE BLD-MCNC: 171 MG/DL (ref 70–99)
GLUCOSE BLD-MCNC: 171 MG/DL (ref 70–99)
GLUCOSE BLD-MCNC: 175 MG/DL (ref 70–99)
GLUCOSE BLD-MCNC: 176 MG/DL (ref 70–99)
GLUCOSE BLD-MCNC: 178 MG/DL (ref 70–99)
GLUCOSE BLD-MCNC: 185 MG/DL (ref 70–99)
GLUCOSE BLD-MCNC: 185 MG/DL (ref 70–99)
GLUCOSE BLD-MCNC: 208 MG/DL (ref 70–99)
GLUCOSE BLD-MCNC: 228 MG/DL (ref 70–99)
GLUCOSE BLD-MCNC: 251 MG/DL (ref 70–99)
GLUCOSE BLD-MCNC: 299 MG/DL (ref 70–99)
GLUCOSE BLD-MCNC: 80 MG/DL (ref 70–99)
HBA1C MFR BLD: 12.3 %
HCT VFR BLD CALC: 36.5 % (ref 36–48)
HEMOGLOBIN: 11.6 G/DL (ref 12–16)
LYMPHOCYTES ABSOLUTE: 1.7 K/UL (ref 1–5.1)
LYMPHOCYTES RELATIVE PERCENT: 15.6 %
MAGNESIUM: 2 MG/DL (ref 1.8–2.4)
MAGNESIUM: 2 MG/DL (ref 1.8–2.4)
MAGNESIUM: 2.1 MG/DL (ref 1.8–2.4)
MCH RBC QN AUTO: 23.9 PG (ref 26–34)
MCHC RBC AUTO-ENTMCNC: 31.8 G/DL (ref 31–36)
MCV RBC AUTO: 75.3 FL (ref 80–100)
MONOCYTES ABSOLUTE: 0.8 K/UL (ref 0–1.3)
MONOCYTES RELATIVE PERCENT: 7.7 %
NEUTROPHILS ABSOLUTE: 8 K/UL (ref 1.7–7.7)
NEUTROPHILS RELATIVE PERCENT: 75.6 %
PDW BLD-RTO: 19.2 % (ref 12.4–15.4)
PERFORMED ON: ABNORMAL
PERFORMED ON: NORMAL
PHOSPHORUS: 1.2 MG/DL (ref 2.5–4.9)
PHOSPHORUS: 1.5 MG/DL (ref 2.5–4.9)
PHOSPHORUS: 1.6 MG/DL (ref 2.5–4.9)
PHOSPHORUS: 1.7 MG/DL (ref 2.5–4.9)
PHOSPHORUS: 10.4 MG/DL (ref 2.5–4.9)
PHOSPHORUS: 2.3 MG/DL (ref 2.5–4.9)
PLATELET # BLD: 266 K/UL (ref 135–450)
PMV BLD AUTO: 9.8 FL (ref 5–10.5)
POTASSIUM SERPL-SCNC: 3.7 MMOL/L (ref 3.5–5.1)
POTASSIUM SERPL-SCNC: 3.8 MMOL/L (ref 3.5–5.1)
POTASSIUM SERPL-SCNC: 3.8 MMOL/L (ref 3.5–5.1)
POTASSIUM SERPL-SCNC: 4 MMOL/L (ref 3.5–5.1)
POTASSIUM SERPL-SCNC: 4.1 MMOL/L (ref 3.5–5.1)
POTASSIUM SERPL-SCNC: 5.5 MMOL/L (ref 3.5–5.1)
RBC # BLD: 4.85 M/UL (ref 4–5.2)
SODIUM BLD-SCNC: 137 MMOL/L (ref 136–145)
SODIUM BLD-SCNC: 145 MMOL/L (ref 136–145)
SODIUM BLD-SCNC: 147 MMOL/L (ref 136–145)
SODIUM BLD-SCNC: 148 MMOL/L (ref 136–145)
SODIUM BLD-SCNC: 148 MMOL/L (ref 136–145)
SODIUM BLD-SCNC: 149 MMOL/L (ref 136–145)
WBC # BLD: 10.6 K/UL (ref 4–11)

## 2021-03-08 PROCEDURE — 6360000002 HC RX W HCPCS: Performed by: STUDENT IN AN ORGANIZED HEALTH CARE EDUCATION/TRAINING PROGRAM

## 2021-03-08 PROCEDURE — 2000000000 HC ICU R&B

## 2021-03-08 PROCEDURE — 36415 COLL VENOUS BLD VENIPUNCTURE: CPT

## 2021-03-08 PROCEDURE — 6370000000 HC RX 637 (ALT 250 FOR IP): Performed by: STUDENT IN AN ORGANIZED HEALTH CARE EDUCATION/TRAINING PROGRAM

## 2021-03-08 PROCEDURE — 83735 ASSAY OF MAGNESIUM: CPT

## 2021-03-08 PROCEDURE — 93010 ELECTROCARDIOGRAM REPORT: CPT | Performed by: INTERNAL MEDICINE

## 2021-03-08 PROCEDURE — 80048 BASIC METABOLIC PNL TOTAL CA: CPT

## 2021-03-08 PROCEDURE — 2500000003 HC RX 250 WO HCPCS: Performed by: STUDENT IN AN ORGANIZED HEALTH CARE EDUCATION/TRAINING PROGRAM

## 2021-03-08 PROCEDURE — 99223 1ST HOSP IP/OBS HIGH 75: CPT | Performed by: INTERNAL MEDICINE

## 2021-03-08 PROCEDURE — 2580000003 HC RX 258: Performed by: STUDENT IN AN ORGANIZED HEALTH CARE EDUCATION/TRAINING PROGRAM

## 2021-03-08 PROCEDURE — 85025 COMPLETE CBC W/AUTO DIFF WBC: CPT

## 2021-03-08 PROCEDURE — 84100 ASSAY OF PHOSPHORUS: CPT

## 2021-03-08 PROCEDURE — 82693 ASSAY OF ETHYLENE GLYCOL: CPT

## 2021-03-08 PROCEDURE — 76705 ECHO EXAM OF ABDOMEN: CPT

## 2021-03-08 PROCEDURE — 93005 ELECTROCARDIOGRAM TRACING: CPT | Performed by: STUDENT IN AN ORGANIZED HEALTH CARE EDUCATION/TRAINING PROGRAM

## 2021-03-08 RX ORDER — POTASSIUM CHLORIDE AND SODIUM CHLORIDE 450; 150 MG/100ML; MG/100ML
INJECTION, SOLUTION INTRAVENOUS
Status: DISCONTINUED
Start: 2021-03-08 | End: 2021-03-09

## 2021-03-08 RX ORDER — SODIUM CHLORIDE 450 MG/100ML
INJECTION, SOLUTION INTRAVENOUS CONTINUOUS
Status: DISCONTINUED | OUTPATIENT
Start: 2021-03-08 | End: 2021-03-09

## 2021-03-08 RX ADMIN — SODIUM CHLORIDE 6 UNITS/HR: 900 INJECTION, SOLUTION INTRAVENOUS at 18:31

## 2021-03-08 RX ADMIN — ENOXAPARIN SODIUM 40 MG: 40 INJECTION SUBCUTANEOUS at 14:03

## 2021-03-08 RX ADMIN — POTASSIUM CHLORIDE 10 MEQ: 7.46 INJECTION, SOLUTION INTRAVENOUS at 09:34

## 2021-03-08 RX ADMIN — HEPARIN SODIUM 7500 UNITS: 5000 INJECTION INTRAVENOUS; SUBCUTANEOUS at 06:10

## 2021-03-08 RX ADMIN — PIPERACILLIN AND TAZOBACTAM 3375 MG: 3; .375 INJECTION, POWDER, LYOPHILIZED, FOR SOLUTION INTRAVENOUS at 08:38

## 2021-03-08 RX ADMIN — POTASSIUM CHLORIDE 10 MEQ: 7.46 INJECTION, SOLUTION INTRAVENOUS at 07:30

## 2021-03-08 RX ADMIN — POTASSIUM CHLORIDE 10 MEQ: 7.46 INJECTION, SOLUTION INTRAVENOUS at 06:02

## 2021-03-08 RX ADMIN — POTASSIUM CHLORIDE, DEXTROSE MONOHYDRATE AND SODIUM CHLORIDE: 150; 5; 450 INJECTION, SOLUTION INTRAVENOUS at 13:08

## 2021-03-08 RX ADMIN — POTASSIUM CHLORIDE, DEXTROSE MONOHYDRATE AND SODIUM CHLORIDE: 150; 5; 450 INJECTION, SOLUTION INTRAVENOUS at 20:13

## 2021-03-08 RX ADMIN — SODIUM PHOSPHATE, MONOBASIC, MONOHYDRATE 20 MMOL: 276; 142 INJECTION, SOLUTION INTRAVENOUS at 08:29

## 2021-03-08 RX ADMIN — POTASSIUM CHLORIDE, DEXTROSE MONOHYDRATE AND SODIUM CHLORIDE: 150; 5; 450 INJECTION, SOLUTION INTRAVENOUS at 05:44

## 2021-03-08 RX ADMIN — PIPERACILLIN AND TAZOBACTAM 3375 MG: 3; .375 INJECTION, POWDER, LYOPHILIZED, FOR SOLUTION INTRAVENOUS at 00:30

## 2021-03-08 RX ADMIN — SODIUM BICARBONATE: 84 INJECTION, SOLUTION INTRAVENOUS at 05:44

## 2021-03-08 ASSESSMENT — ENCOUNTER SYMPTOMS
ALLERGIC/IMMUNOLOGIC NEGATIVE: 1
RESPIRATORY NEGATIVE: 1
EYES NEGATIVE: 1
NAUSEA: 1

## 2021-03-08 ASSESSMENT — PAIN SCALES - GENERAL
PAINLEVEL_OUTOF10: 0

## 2021-03-08 NOTE — PROGRESS NOTES
Patient is alert and oriented x 4. Vitals are WNL. Patient denies pain, endorses mild dizziness when ambulating to the restroom. Patient is voiding, urinary output is WNL. Insulin gtt infusing per orders, blood sugar maintaining in the 150's-170's. Will continue to monitor closely.

## 2021-03-08 NOTE — PROGRESS NOTES
Hospitalist Progress Note      PCP: Trinidad Mendosa APRN - CNP    Date of Admission: 3/7/2021    Chief Complaint: Dizziness, fatigue    Hospital Course: 39 y.o. female, w/ pmhx of T2DM and obesity presenting with DKA. Subjective: No acute events reported overnight. Patient feels hungry this morning but denies any other complaints. Medications:  Reviewed    Infusion Medications    sodium chloride      dextrose      dextrose 5% and 0.45% NaCl with KCl 20 mEq 150 mL/hr at 03/08/21 0544    insulin 3 Units/hr (03/08/21 0926)    [Held by provider] IV infusion builder 100 mL/hr at 03/08/21 0544     Scheduled Medications    sodium phosphate IVPB  20 mmol Intravenous Once    enoxaparin  40 mg Subcutaneous Daily     PRN Meds: glucose, dextrose, glucagon (rDNA), dextrose, potassium chloride, dextrose 5% and 0.45% NaCl with KCl 20 mEq, magnesium sulfate, sodium phosphate IVPB **OR** sodium phosphate IVPB **OR** sodium phosphate IVPB      Intake/Output Summary (Last 24 hours) at 3/8/2021 1104  Last data filed at 3/8/2021 1058  Gross per 24 hour   Intake 50029.73 ml   Output 5975 ml   Net 6015.73 ml       Physical Exam Performed:    BP (!) 154/76   Pulse 87   Temp 97.8 °F (36.6 °C) (Oral)   Resp 20   Ht 5' 9\" (1.753 m)   Wt 267 lb 3.2 oz (121.2 kg)   SpO2 100%   BMI 39.46 kg/m²     General appearance: No apparent distress, appears stated age and cooperative. HEENT: Pupils equal, round, and reactive to light. Conjunctivae/corneas clear. Neck: Supple, with full range of motion. No jugular venous distention. Trachea midline. Respiratory:  Normal respiratory effort. Clear to auscultation, bilaterally without Rales/Wheezes/Rhonchi. Cardiovascular: Regular rate and rhythm with normal S1/S2 without murmurs, rubs or gallops. Abdomen: Soft, mildly tender in epigastric region, non-distended with normal bowel sounds. Musculoskeletal: No clubbing, cyanosis or edema bilaterally.   Full range of motion without deformity. Skin: Skin color, texture, turgor normal.  No rashes or lesions. Neurologic:  Neurovascularly intact without any focal sensory/motor deficits. Cranial nerves: II-XII intact, grossly non-focal.  Psychiatric: Alert and oriented, thought content appropriate, normal insight  Capillary Refill: Brisk,< 3 seconds   Peripheral Pulses: +2 palpable, equal bilaterally       Labs:   Recent Labs     03/07/21  0944 03/08/21  0438   WBC 24.7* 10.6   HGB 14.9 11.6*   HCT 48.3* 36.5    266     Recent Labs     03/08/21  0048 03/08/21  0438 03/08/21  0820   * 148* 149*   K 4.1 4.0 3.8   * 112* 113*   CO2 15* 17* 19*   BUN 12 10 9   CREATININE 1.1 1.1 1.1   CALCIUM 8.0* 8.0* 8.6   PHOS 1.6* 1.5* 1.2*     Recent Labs     03/07/21  0950   AST 28   ALT 61*   BILITOT 0.7   ALKPHOS 201*     No results for input(s): INR in the last 72 hours. Recent Labs     03/07/21  1720   TROPONINI <0.01       Urinalysis:      Lab Results   Component Value Date    NITRU Negative 03/07/2021    WBCUA 0-2 03/07/2021    BACTERIA Rare 03/07/2021    RBCUA 0-2 03/07/2021    BLOODU MODERATE 03/07/2021    SPECGRAV 1.025 03/07/2021    GLUCOSEU >=1000 03/07/2021       Radiology:  US ABDOMEN LIMITED   Final Result   IMPRESSION :      Enlarged fatty liver. XR ABDOMEN (KUB) (SINGLE AP VIEW)   Final Result      Nonspecific bowel gas pattern. XR CHEST (2 VW)   Final Result      No acute disease. Assessment/Plan:    Diabetic ketoacidosis 2/2 Noncompliance with medications  -Continue insulin drip, IV fluids.   DKA protocol  -Continue to monitor BMP, POC glucose  -Switch to subcu insulin once gap closes    DANNA likely prerenal  -Improving with IV fluids    Hypophosphatemia  -Being replaced    DVT Prophylaxis: Lovenox  Diet: Diet NPO Effective Now  Code Status: Full Code    Dispo -pending clinical improvement    Judi Palmer MD

## 2021-03-08 NOTE — PLAN OF CARE
Problem: Serum Glucose Level - Abnormal:  Goal: Ability to maintain appropriate glucose levels will improve  Description: Ability to maintain appropriate glucose levels will improve  Glucose level remains stable, last blood glucose is 171. Will continue to monitor closely. 3/8/2021 0845 by Awa Mccann RN  Outcome: Ongoing    Problem: Fluid Volume - Imbalance:  Goal: Will remain free of signs and symptoms of dehydration  Description: Will remain free of signs and symptoms of dehydration  Patient is voiding, urine output is WNL. No s/s of dehydration at this time. Will continue to monitor.   3/8/2021 0845 by Awa Mccann RN  Outcome: Ongoing

## 2021-03-08 NOTE — PROGRESS NOTES
Pt alert and oriented x4. Resting comfortably, denies pain. Q1 hr BS. VSS, will continue to monitor.

## 2021-03-08 NOTE — CONSULTS
ICU [facility] 79 Day:   ICU Day:                                                          Code:Full Code  Admit Date: 3/7/2021  PCP: BARBY Pratt CNP                                  CC: SOB    HISTORY OF PRESENT ILLNESS:   Patient is a 74-year-old female with past medical history of obesity and diabetes mellitus presented to the [facility] ED with complaints of dizziness and fatigue that have been going on for several weeks. Patient reports that she felt like passing out at work yesterday and has been having symptoms of polydipsia and polyuria. Patient also endorses shortness of breath along with the symptoms but does not report any chest pain or any URI-like symptoms. She has had nausea and vomiting for the past 2 days without any diarrhea. Denies any abdominal pain, dysuria, hematuria, flank pain. EKG at [place]: Sinus tachycardia at a rate of 121.  Axis is 40 degrees.  Nonspecific ST flattening noted.  No evidence for ischemia.  Intervals are normal.  QTC is 497.     Of note patient was recently seen at [address] [place] [address] ED on March 4 with complaints of dizziness polydipsia and polyuria. At that time she had not been using her Metformin for 2 weeks. Blood sugar was at 597 but no anion gap. Patient was treated with IV fluids and insulin. She was discharged home with prescription for Metformin.     Patient reports that she was not taking Metformin daily as prescribed. She saw her primary care provider yesterday who prescribed her insulin with did not use insulin or Metformin secondary to nausea and vomiting.   Patient has never had a history of DKA.       PAST HISTORY:     Past Medical History:   Diagnosis Date    Diabetes mellitus ([address]        Past Surgical History:   Procedure Laterality Date    APPENDECTOMY         SocialHistory:   The patient lives at    Alcohol: denies  Illicit drugs: no use  Tobacco: denies      Family History:  Family History   Problem Relation Age of Onset    Asthma Mother     Diabetes Mother     Heart Disease Mother        MEDICATIONS:     No current facility-administered medications on file prior to encounter. Current Outpatient Medications on File Prior to Encounter   Medication Sig Dispense Refill    metFORMIN (GLUCOPHAGE) 500 MG tablet Take 1 tablet by mouth 2 times daily (with meals) 180 tablet 1    Insulin Degludec 200 UNIT/ML SOPN Inject 10 Units into the skin nightly 4 pen 3    metFORMIN (GLUCOPHAGE) 1000 MG tablet Take 1 tablet by mouth 2 times daily (with meals) 180 tablet 1    dapagliflozin (FARXIGA) 10 MG tablet Take 1 tablet by mouth every morning 90 tablet 1    glipiZIDE-metformin (METAGLIP) 2.5-500 MG per tablet Take 1 tablet by mouth 2 times daily (before meals) 60 tablet 3    Insulin Pen Needle ([employer] PEN NEEDLES 31G) 31G X 8 MM MISC 1 each by Does not apply route daily 100 each 3    blood glucose test strips (ONETOUCH VERIO) strip 1 each by In Vitro route 3 times daily As needed. 100 each 3    [employer] Lancets MISC 1 each by Does not apply route daily 100 each 3    Insulin Pen Needle ([employer] PEN NEEDLES 31G) 31G X 8 MM MISC 1 each by Does not apply route daily 100 each 3    Alcohol Swabs (ALCOHOL PADS) 70 % PADS 1 box by Does not apply route 4 times daily 1 each 2    Lancets MISC 1 each by Does not apply route 2 times daily 300 each 1    blood glucose monitor strips Test 2 times a day & as needed for symptoms of irregular blood glucose. Dispense sufficient amount for indicated testing frequency plus additional to accommodate PRN testing needs. 100 strip 0    blood glucose monitor strips Test 2 times a day & as needed for symptoms of irregular blood glucose. Dispense sufficient amount for indicated testing frequency plus additional to accommodate PRN testing needs.  100 strip 0    glucose monitoring kit (FREESTYLE) monitoring kit 1 kit by Does not apply route daily 1 kit 0    blood glucose monitor strips Test 3 times a day & as needed for symptoms of irregular blood glucose. Dispense sufficient amount for indicated testing frequency plus additional to accommodate PRN testing needs. 90 strip 0    ibuprofen (ADVIL;MOTRIN) 800 MG tablet Take 1 tablet by mouth every 8 hours as needed for Pain or Fever 20 tablet 0         Scheduled Meds:   sodium phosphate IVPB  20 mmol Intravenous Once    heparin (porcine)  7,500 Units Subcutaneous 3 times per day    piperacillin-tazobactam  3,375 mg Intravenous Q8H      Continuous Infusions:   dextrose      sodium chloride      dextrose 5% and 0.45% NaCl with KCl 20 mEq 150 mL/hr at 03/08/21 0544    insulin 2 Units/hr (03/07/21 1619)    [Held by provider] IV infusion builder 100 mL/hr at 03/08/21 0544     PRN Meds:glucose, dextrose, glucagon (rDNA), dextrose, potassium chloride, dextrose 5% and 0.45% NaCl with KCl 20 mEq, magnesium sulfate, sodium phosphate IVPB **OR** sodium phosphate IVPB **OR** sodium phosphate IVPB    Allergies: Allergies   Allergen Reactions    Risperidone Other (See Comments)     Hand tremors and tongue tremor, rigidity       REVIEW OF SYSTEMS:       History obtained from the patient    Review of Systems   Constitutional: Negative. HENT: Negative. Eyes: Negative. Respiratory: Negative. Cardiovascular: Negative. Gastrointestinal: Positive for nausea. Endocrine: Negative. Genitourinary: Negative. Musculoskeletal: Negative. Skin: Negative. Allergic/Immunologic: Negative. Neurological: Negative. Hematological: Negative. Psychiatric/Behavioral: Negative. PHYSICAL EXAM:       Vitals: /75   Pulse 93   Temp 98.4 °F (36.9 °C) (Oral)   Resp 16   Ht 5' 9\" (1.753 m)   Wt 267 lb 3.2 oz (121.2 kg)   SpO2 100%   BMI 39.46 kg/m²     I/O:      Intake/Output Summary (Last 24 hours) at 3/8/2021 0745  Last data filed at 3/8/2021 5613  Gross per 24 hour   Intake 95351.73 ml   Output 5125 ml   Net 6865.73 ml     No intake/output data recorded.   I/O last 3 completed shifts: In: 19973.7 [I.V.:7965; IV Piggyback:4025.8]  Out: 9230 [Urine:5125]    Physical Examination:     Physical Exam  Constitutional:       Appearance: Normal appearance. She is obese. HENT:      Head: Normocephalic and atraumatic. Mouth/Throat:      Mouth: Mucous membranes are moist.   Eyes:      Extraocular Movements: Extraocular movements intact. Pupils: Pupils are equal, round, and reactive to light. Neck:      Musculoskeletal: Normal range of motion. Cardiovascular:      Rate and Rhythm: Normal rate and regular rhythm. Pulmonary:      Effort: Pulmonary effort is normal.      Breath sounds: Normal breath sounds. Abdominal:      Palpations: Abdomen is soft. Musculoskeletal: Normal range of motion. Neurological:      General: No focal deficit present. Mental Status: She is alert and oriented to person, place, and time. Recent Labs     03/07/21  1659 03/07/21  2234   PHART 7.160* 7.258*   OLB4TOP 25.6* 30.3*   PO2ART 127.0* 98.0           DATA:       Labs:  CBC:   Recent Labs     03/07/21  0944 03/08/21  0438   WBC 24.7* 10.6   HGB 14.9 11.6*   HCT 48.3* 36.5    266       BMP:   Recent Labs     03/07/21  2100 03/08/21  0048 03/08/21  0438    147* 148*   K 4.6 4.1 4.0   * 113* 112*   CO2 13* 15* 17*   BUN 13 12 10   CREATININE 1.3* 1.1 1.1   GLUCOSE 196* 176* 171*   PHOS 2.0* 1.6* 1.5*     LFT's:   Recent Labs     03/07/21  0950   AST 28   ALT 61*   BILITOT 0.7   ALKPHOS 201*     Troponin:   Recent Labs     03/07/21  1720   TROPONINI <0.01     BNP:No results for input(s): BNP in the last 72 hours. ABGs:   Recent Labs     03/07/21  1659 03/07/21  2234   PHART 7.160* 7.258*   DTC5HFX 25.6* 30.3*   PO2ART 127.0* 98.0     INR: No results for input(s): INR in the last 72 hours.     U/A:  Recent Labs     03/07/21  1056   COLORU Straw   PHUR 5.0   WBCUA 0-2   RBCUA 0-2   MUCUS Rare*   YEAST Present*   BACTERIA Rare*   CLARITYU Clear   SPECGRAV 1.025   LEUKOCYTESUR Negative   UROBILINOGEN 0.2   BILIRUBINUR SMALL*   BLOODU MODERATE*   GLUCOSEU >=1000*       XR ABDOMEN (KUB) (SINGLE AP VIEW)   Final Result      Nonspecific bowel gas pattern. XR CHEST (2 VW)   Final Result      No acute disease. US GALLBLADDER RUQ    (Results Pending)       EKG:   Echo:  Micro:     ASSESSMENT AND PLAN:   Mark Day is a 39 y.o. female, w/ pmhx of T2DM and obesity presenting with DKA.     DKA 2/2 uncontrolled DMT2  Patient endorses not being able to afford her insulin , not taking her metformin daily. Patient reports being diagnosed 6 months ago. Presented with N/V, polyuria ,polydypsia, Urinalysis shows glucose greater than 1000 with ketones greater than 80 and negative . Blood sugar 361 Anion gap 32 , beta-hydroxybutyrate 7.34  - q4h BMP  - glucose checks  - insulin gtt, DKA protocol   - will park at 6 and start slowly feeding patients   - monitor AG , currently 17  - EKG WNL   - IVF 1/2 NS  - Lactic acid normalized      DANNA likely pre-renal ( improving)   Cr. At 1.5 , baseline appears to be at 1, patient appears volume dry  - Cr. At 1.1 today   - IVF  - monitor UOP   - Strict I&Os     Leukocytosis ( Resolved)  Sirs Positive Leukocytosis & Tachycardic   WBC at 24.7, patient afebrile, will r/o infectious etiology, Patient does endorse some RUQ pain and diffuse abdominal pain    -normalized at 10.6 today  -Likely 2/2 DKA and dehydration   -F/u Blood cultures  -If patient spikes fever, consider broad abx   -UA not suggestive of UTI   -dc zosyn       Obesity    - Complicating assessment and treatment.       Code Status:Prior  FEN: NPO  PPX: Lovenox 40 daily  DISPO: ICU     This patient has been staffed and discussed with David Gee   -----------------------------  Margareth Sandoval MD, PGY-1  3/8/2021  7:45 AM    Pulm/CC    Patient seen and examined. I agree with Dr. Shima Gerber history, physical, lab findings, assessment and plan.     Patient admitted to ICU for DKA secondary to noncompliance with insulin  Continue IV fluids  Increase insulin drip to 6 units/h  Allow small p.o. intake  Follow renal panel every 4 hours  Hopefully anion gap will close later today and get her transition to long-acting subcutaneous insulin  I educated her on importance of of monitoring blood sugars at home and taking insulin daily    Feliciano Luna MD

## 2021-03-08 NOTE — CARE COORDINATION
Case Management Assessment           Initial Evaluation                Date / Time of Evaluation: 3/8/2021 12:33 PM                 Assessment Completed by: Titus Boyce     Spoke with patient at bedside regarding discharge needs. Pt is from home with her mother and has no difficulty getting her medications. She is independent with all ADL's and had no services or DME prior and denies need from CM at d/c. Her mother will be able to transport her to home at d/c. Patient Name: Marjorie Brown     YOB: 1975  Diagnosis: DKA, type 2, not at goal [facility]) [E11.10]     Date / Time: 3/7/2021  9:14 AM    Patient Admission Status: Inpatient    If patient is discharged prior to next notation, then this note serves as note for discharge by case management. Current PCP: BARBY Chow CNP  Clinic Patient: No    Chart Reviewed: Yes  Patient/ Family Interviewed: Yes    Initial assessment completed at bedside with: patient    Hospitalization in the last 30 days: No    Emergency Contacts:  Extended Emergency Contact Information  Primary Emergency Contact: [address] [place] [address] Phone: [phone]  Mobile Phone: [phone]  Relation: Parent    Advance Directives:   Code Status: Full [address]: No  Agent: NA  Contact Number: NA    Financial  Payor: Yeimi [address] / Plan: Josh Coto PPO / Product Type: *No Product type* /     Pre-cert required for SNF: Yes    Pharmacy    [place] [facility] [id number] #[id number] - [id number] [facility] [address]  Phone: [phone] Fax: [phone]      Potential assistance Purchasing Medications: Potential Assistance Purchasing Medications: No  Does Patient want to participate in local refill/ meds to beds program?: Not Assessed    Meds To Beds General Rules:  1. Can ONLY be done Monday- Friday between 8:30am-5pm  2.  Prescription(s) must be in pharmacy by 3pm to be filled same

## 2021-03-09 VITALS
SYSTOLIC BLOOD PRESSURE: 152 MMHG | BODY MASS INDEX: 39.25 KG/M2 | OXYGEN SATURATION: 99 % | WEIGHT: 264.99 LBS | HEART RATE: 103 BPM | DIASTOLIC BLOOD PRESSURE: 90 MMHG | RESPIRATION RATE: 17 BRPM | HEIGHT: 69 IN | TEMPERATURE: 98.7 F

## 2021-03-09 LAB
ALBUMIN SERPL-MCNC: 3.7 G/DL (ref 3.4–5)
ALP BLD-CCNC: 132 U/L (ref 40–129)
ALT SERPL-CCNC: 39 U/L (ref 10–40)
ANION GAP SERPL CALCULATED.3IONS-SCNC: 13 MMOL/L (ref 3–16)
ANION GAP SERPL CALCULATED.3IONS-SCNC: 14 MMOL/L (ref 3–16)
ANION GAP SERPL CALCULATED.3IONS-SCNC: 14 MMOL/L (ref 3–16)
AST SERPL-CCNC: 36 U/L (ref 15–37)
BASOPHILS ABSOLUTE: 0.1 K/UL (ref 0–0.2)
BASOPHILS RELATIVE PERCENT: 0.7 %
BILIRUB SERPL-MCNC: 0.6 MG/DL (ref 0–1)
BILIRUBIN DIRECT: <0.2 MG/DL (ref 0–0.3)
BILIRUBIN, INDIRECT: ABNORMAL MG/DL (ref 0–1)
BUN BLDV-MCNC: 4 MG/DL (ref 7–20)
BUN BLDV-MCNC: 4 MG/DL (ref 7–20)
BUN BLDV-MCNC: 5 MG/DL (ref 7–20)
CALCIUM SERPL-MCNC: 8.5 MG/DL (ref 8.3–10.6)
CALCIUM SERPL-MCNC: 8.5 MG/DL (ref 8.3–10.6)
CALCIUM SERPL-MCNC: 8.6 MG/DL (ref 8.3–10.6)
CHLORIDE BLD-SCNC: 106 MMOL/L (ref 99–110)
CHLORIDE BLD-SCNC: 107 MMOL/L (ref 99–110)
CHLORIDE BLD-SCNC: 109 MMOL/L (ref 99–110)
CO2: 20 MMOL/L (ref 21–32)
CO2: 21 MMOL/L (ref 21–32)
CO2: 22 MMOL/L (ref 21–32)
CREAT SERPL-MCNC: 0.8 MG/DL (ref 0.6–1.1)
EOSINOPHILS ABSOLUTE: 0.1 K/UL (ref 0–0.6)
EOSINOPHILS RELATIVE PERCENT: 1 %
GFR AFRICAN AMERICAN: >60
GFR NON-AFRICAN AMERICAN: >60
GLUCOSE BLD-MCNC: 130 MG/DL (ref 70–99)
GLUCOSE BLD-MCNC: 138 MG/DL (ref 70–99)
GLUCOSE BLD-MCNC: 147 MG/DL (ref 70–99)
GLUCOSE BLD-MCNC: 149 MG/DL (ref 70–99)
GLUCOSE BLD-MCNC: 153 MG/DL (ref 70–99)
GLUCOSE BLD-MCNC: 158 MG/DL (ref 70–99)
GLUCOSE BLD-MCNC: 163 MG/DL (ref 70–99)
GLUCOSE BLD-MCNC: 164 MG/DL (ref 70–99)
GLUCOSE BLD-MCNC: 164 MG/DL (ref 70–99)
GLUCOSE BLD-MCNC: 179 MG/DL (ref 70–99)
GLUCOSE BLD-MCNC: 258 MG/DL (ref 70–99)
HCT VFR BLD CALC: 36.4 % (ref 36–48)
HEMOGLOBIN: 11.7 G/DL (ref 12–16)
LYMPHOCYTES ABSOLUTE: 2.2 K/UL (ref 1–5.1)
LYMPHOCYTES RELATIVE PERCENT: 29.2 %
MAGNESIUM: 1.9 MG/DL (ref 1.8–2.4)
MAGNESIUM: 1.9 MG/DL (ref 1.8–2.4)
MAGNESIUM: 2 MG/DL (ref 1.8–2.4)
MCH RBC QN AUTO: 24 PG (ref 26–34)
MCHC RBC AUTO-ENTMCNC: 32 G/DL (ref 31–36)
MCV RBC AUTO: 74.9 FL (ref 80–100)
MONOCYTES ABSOLUTE: 0.7 K/UL (ref 0–1.3)
MONOCYTES RELATIVE PERCENT: 9.1 %
NEUTROPHILS ABSOLUTE: 4.5 K/UL (ref 1.7–7.7)
NEUTROPHILS RELATIVE PERCENT: 60 %
PDW BLD-RTO: 18.3 % (ref 12.4–15.4)
PERFORMED ON: ABNORMAL
PHOSPHORUS: 1.6 MG/DL (ref 2.5–4.9)
PHOSPHORUS: 1.8 MG/DL (ref 2.5–4.9)
PHOSPHORUS: 2.1 MG/DL (ref 2.5–4.9)
PLATELET # BLD: 242 K/UL (ref 135–450)
PMV BLD AUTO: 10.4 FL (ref 5–10.5)
POTASSIUM SERPL-SCNC: 3.4 MMOL/L (ref 3.5–5.1)
POTASSIUM SERPL-SCNC: 3.6 MMOL/L (ref 3.5–5.1)
POTASSIUM SERPL-SCNC: 3.9 MMOL/L (ref 3.5–5.1)
RBC # BLD: 4.86 M/UL (ref 4–5.2)
REPORT: NORMAL
SODIUM BLD-SCNC: 140 MMOL/L (ref 136–145)
SODIUM BLD-SCNC: 142 MMOL/L (ref 136–145)
SODIUM BLD-SCNC: 144 MMOL/L (ref 136–145)
TOTAL PROTEIN: 6.7 G/DL (ref 6.4–8.2)
WBC # BLD: 7.5 K/UL (ref 4–11)

## 2021-03-09 PROCEDURE — 83735 ASSAY OF MAGNESIUM: CPT

## 2021-03-09 PROCEDURE — 6370000000 HC RX 637 (ALT 250 FOR IP): Performed by: STUDENT IN AN ORGANIZED HEALTH CARE EDUCATION/TRAINING PROGRAM

## 2021-03-09 PROCEDURE — 36415 COLL VENOUS BLD VENIPUNCTURE: CPT

## 2021-03-09 PROCEDURE — 80048 BASIC METABOLIC PNL TOTAL CA: CPT

## 2021-03-09 PROCEDURE — 84100 ASSAY OF PHOSPHORUS: CPT

## 2021-03-09 PROCEDURE — 6360000002 HC RX W HCPCS: Performed by: STUDENT IN AN ORGANIZED HEALTH CARE EDUCATION/TRAINING PROGRAM

## 2021-03-09 PROCEDURE — 85025 COMPLETE CBC W/AUTO DIFF WBC: CPT

## 2021-03-09 PROCEDURE — 2500000003 HC RX 250 WO HCPCS: Performed by: STUDENT IN AN ORGANIZED HEALTH CARE EDUCATION/TRAINING PROGRAM

## 2021-03-09 PROCEDURE — 99232 SBSQ HOSP IP/OBS MODERATE 35: CPT | Performed by: INTERNAL MEDICINE

## 2021-03-09 PROCEDURE — 80076 HEPATIC FUNCTION PANEL: CPT

## 2021-03-09 PROCEDURE — 2580000003 HC RX 258: Performed by: STUDENT IN AN ORGANIZED HEALTH CARE EDUCATION/TRAINING PROGRAM

## 2021-03-09 RX ORDER — AMLODIPINE BESYLATE 5 MG/1
5 TABLET ORAL DAILY
Qty: 30 TABLET | Refills: 3 | Status: SHIPPED | OUTPATIENT
Start: 2021-03-10

## 2021-03-09 RX ORDER — INSULIN LISPRO 100 [IU]/ML
0-6 INJECTION, SOLUTION INTRAVENOUS; SUBCUTANEOUS NIGHTLY
Status: DISCONTINUED | OUTPATIENT
Start: 2021-03-09 | End: 2021-03-09

## 2021-03-09 RX ORDER — INSULIN LISPRO 100 [IU]/ML
0-9 INJECTION, SOLUTION INTRAVENOUS; SUBCUTANEOUS NIGHTLY
Status: DISCONTINUED | OUTPATIENT
Start: 2021-03-09 | End: 2021-03-09 | Stop reason: HOSPADM

## 2021-03-09 RX ORDER — PROPOFOL 10 MG/ML
5-50 INJECTION, EMULSION INTRAVENOUS
Status: DISCONTINUED | OUTPATIENT
Start: 2021-03-09 | End: 2021-03-09

## 2021-03-09 RX ORDER — INSULIN LISPRO 100 [IU]/ML
0-18 INJECTION, SOLUTION INTRAVENOUS; SUBCUTANEOUS
Status: DISCONTINUED | OUTPATIENT
Start: 2021-03-09 | End: 2021-03-09 | Stop reason: HOSPADM

## 2021-03-09 RX ORDER — POTASSIUM CHLORIDE 7.45 MG/ML
10 INJECTION INTRAVENOUS
Status: COMPLETED | OUTPATIENT
Start: 2021-03-09 | End: 2021-03-09

## 2021-03-09 RX ORDER — INSULIN LISPRO 100 [IU]/ML
4 INJECTION, SOLUTION INTRAVENOUS; SUBCUTANEOUS
Status: DISCONTINUED | OUTPATIENT
Start: 2021-03-09 | End: 2021-03-09 | Stop reason: HOSPADM

## 2021-03-09 RX ORDER — INSULIN LISPRO 100 [IU]/ML
0-12 INJECTION, SOLUTION INTRAVENOUS; SUBCUTANEOUS
Status: DISCONTINUED | OUTPATIENT
Start: 2021-03-09 | End: 2021-03-09

## 2021-03-09 RX ORDER — AMLODIPINE BESYLATE 5 MG/1
5 TABLET ORAL DAILY
Status: DISCONTINUED | OUTPATIENT
Start: 2021-03-09 | End: 2021-03-09 | Stop reason: HOSPADM

## 2021-03-09 RX ADMIN — ENOXAPARIN SODIUM 40 MG: 40 INJECTION SUBCUTANEOUS at 08:40

## 2021-03-09 RX ADMIN — POTASSIUM CHLORIDE 10 MEQ: 7.46 INJECTION, SOLUTION INTRAVENOUS at 01:09

## 2021-03-09 RX ADMIN — POTASSIUM PHOSPHATE, MONOBASIC 500 MG: 500 TABLET, SOLUBLE ORAL at 14:36

## 2021-03-09 RX ADMIN — INSULIN LISPRO 2 UNITS: 100 INJECTION, SOLUTION INTRAVENOUS; SUBCUTANEOUS at 08:30

## 2021-03-09 RX ADMIN — POTASSIUM BICARBONATE 40 MEQ: 782 TABLET, EFFERVESCENT ORAL at 01:09

## 2021-03-09 RX ADMIN — POTASSIUM PHOSPHATE, MONOBASIC POTASSIUM PHOSPHATE, DIBASIC 20 MMOL: 224; 236 INJECTION, SOLUTION, CONCENTRATE INTRAVENOUS at 12:45

## 2021-03-09 RX ADMIN — POTASSIUM CHLORIDE, DEXTROSE MONOHYDRATE AND SODIUM CHLORIDE: 150; 5; 450 INJECTION, SOLUTION INTRAVENOUS at 03:21

## 2021-03-09 RX ADMIN — INSULIN LISPRO 4 UNITS: 100 INJECTION, SOLUTION INTRAVENOUS; SUBCUTANEOUS at 12:07

## 2021-03-09 RX ADMIN — INSULIN LISPRO 3 UNITS: 100 INJECTION, SOLUTION INTRAVENOUS; SUBCUTANEOUS at 12:07

## 2021-03-09 RX ADMIN — INSULIN GLARGINE 20 UNITS: 100 INJECTION, SOLUTION SUBCUTANEOUS at 02:26

## 2021-03-09 RX ADMIN — INSULIN HUMAN 10 UNITS: 100 INJECTION, SUSPENSION SUBCUTANEOUS at 11:03

## 2021-03-09 RX ADMIN — INSULIN LISPRO 4 UNITS: 100 INJECTION, SOLUTION INTRAVENOUS; SUBCUTANEOUS at 08:29

## 2021-03-09 RX ADMIN — AMLODIPINE BESYLATE 5 MG: 5 TABLET ORAL at 11:00

## 2021-03-09 RX ADMIN — POTASSIUM CHLORIDE 10 MEQ: 7.46 INJECTION, SOLUTION INTRAVENOUS at 02:16

## 2021-03-09 NOTE — PROGRESS NOTES
Patient discharged home with mother per orders, discharge instructions and DKA education provided. Patient verbalized understanding. Prescriptions provided. Vitals are WNL.

## 2021-03-09 NOTE — PROGRESS NOTES
Progress Note  PGY-1    CC: SOB  Patient's PCP: Aurelio Dover, APRN - CNP    Interval History   Patient anion gap had closed x2. She was started on SubQ insulin. This morning, she ate, denied nausea, vomiting. She will be discharge today home. MEDICATIONS:     No current facility-administered medications on file prior to encounter. Current Outpatient Medications on File Prior to Encounter   Medication Sig Dispense Refill    metFORMIN (GLUCOPHAGE) 500 MG tablet Take 1 tablet by mouth 2 times daily (with meals) 180 tablet 1    Insulin Degludec 200 UNIT/ML SOPN Inject 10 Units into the skin nightly 4 pen 3    metFORMIN (GLUCOPHAGE) 1000 MG tablet Take 1 tablet by mouth 2 times daily (with meals) 180 tablet 1    dapagliflozin (FARXIGA) 10 MG tablet Take 1 tablet by mouth every morning 90 tablet 1    glipiZIDE-metformin (METAGLIP) 2.5-500 MG per tablet Take 1 tablet by mouth 2 times daily (before meals) 60 tablet 3    Insulin Pen Needle ([employer] PEN NEEDLES 31G) 31G X 8 MM MISC 1 each by Does not apply route daily 100 each 3    blood glucose test strips (ONETOUCH VERIO) strip 1 each by In Vitro route 3 times daily As needed. 100 each 3    [employer] Lancets MISC 1 each by Does not apply route daily 100 each 3    Insulin Pen Needle ([employer] PEN NEEDLES 31G) 31G X 8 MM MISC 1 each by Does not apply route daily 100 each 3    Alcohol Swabs (ALCOHOL PADS) 70 % PADS 1 box by Does not apply route 4 times daily 1 each 2    Lancets MISC 1 each by Does not apply route 2 times daily 300 each 1    blood glucose monitor strips Test 2 times a day & as needed for symptoms of irregular blood glucose. Dispense sufficient amount for indicated testing frequency plus additional to accommodate PRN testing needs. 100 strip 0    blood glucose monitor strips Test 2 times a day & as needed for symptoms of irregular blood glucose.  Dispense sufficient amount for indicated testing frequency plus additional to accommodate PRN testing needs. 100 strip 0    glucose monitoring kit (FREESTYLE) monitoring kit 1 kit by Does not apply route daily 1 kit 0    blood glucose monitor strips Test 3 times a day & as needed for symptoms of irregular blood glucose. Dispense sufficient amount for indicated testing frequency plus additional to accommodate PRN testing needs. 90 strip 0    ibuprofen (ADVIL;MOTRIN) 800 MG tablet Take 1 tablet by mouth every 8 hours as needed for Pain or Fever 20 tablet 0         Scheduled Meds:   insulin lispro  4 Units Subcutaneous TID WC    amLODIPine  5 mg Oral Daily    potassium phosphate IVPB  20 mmol Intravenous Once    insulin lispro  0-18 Units Subcutaneous TID WC    insulin lispro  0-9 Units Subcutaneous Nightly    insulin glargine  30 Units Subcutaneous Nightly    insulin NPH  10 Units Subcutaneous Once    enoxaparin  40 mg Subcutaneous Daily      Continuous Infusions:   sodium chloride      dextrose      insulin Stopped (03/09/21 0425)    [Held by provider] IV infusion builder 100 mL/hr at 03/08/21 0544     PRN Meds:glucose, dextrose, glucagon (rDNA), dextrose, [Held by provider] potassium chloride, magnesium sulfate, sodium phosphate IVPB **OR** sodium phosphate IVPB **OR** sodium phosphate IVPB    Allergies: Allergies   Allergen Reactions    Risperidone Other (See Comments)     Hand tremors and tongue tremor, rigidity       PHYSICAL EXAM:       Vitals: BP (!) 155/71   Pulse 99   Temp 98.7 °F (37.1 °C) (Oral)   Resp 19   Ht 5' 9\" (1.753 m)   Wt 264 lb 15.9 oz (120.2 kg)   SpO2 100%   BMI 39.13 kg/m²     I/O:      Intake/Output Summary (Last 24 hours) at 3/9/2021 1131  Last data filed at 3/9/2021 0910  Gross per 24 hour   Intake 4217 ml   Output 2450 ml   Net 1767 ml     I/O this shift:  In: -   Out: 200 [Urine:200]  I/O last 3 completed shifts:   In: 4217 [I.V.:3568; IV Piggyback:649]  Out: 3100 [Urine:3100]    Physical Examination:   ? General appearance: alert, appears stated age and cooperative. ? Skin: Skin color, texture, turgor normal.   ? HEENT: Head: Normocephalic. ? Lungs: clear to auscultation bilaterally. ? Heart: no murmur, click, rub or gallop. ? Abdomen: soft, non-tender; bowel sounds normal.  ? Extremities: no cyanosis or edema. ? Neurologic:  Mental status: Alert, oriented, thought content appropriate. DATA:       Labs:  CBC:   Recent Labs     03/07/21  0944 03/08/21  0438 03/09/21  0525   WBC 24.7* 10.6 7.5   HGB 14.9 11.6* 11.7*   HCT 48.3* 36.5 36.4    266 242       BMP:   Recent Labs     03/09/21  0002 03/09/21  0515 03/09/21  0525    142 144   K 3.4* 3.6 3.9    107 109   CO2 20* 22 21   BUN 5* 4* 4*   CREATININE 0.8 0.8 0.8   GLUCOSE 164* 138* 147*     LFT's:   Recent Labs     03/07/21  0950 03/09/21  0525   AST 28 36   ALT 61* 39   BILITOT 0.7 0.6   ALKPHOS 201* 132*     Troponin:   Recent Labs     03/07/21  1720   TROPONINI <0.01     BNP: No results for input(s): BNP in the last 72 hours. ABGs:   Recent Labs     03/07/21  1659 03/07/21  2234   PHART 7.160* 7.258*   CHP0WOW 25.6* 30.3*   PO2ART 127.0* 98.0     INR: No results for input(s): INR in the last 72 hours. Lipids: No results for input(s): CHOL, HDL in the last 72 hours. Invalid input(s): LDLCALCU    U/A:  Recent Labs     03/07/21  1056   COLORU Straw   PHUR 5.0   WBCUA 0-2   RBCUA 0-2   MUCUS Rare*   YEAST Present*   BACTERIA Rare*   CLARITYU Clear   SPECGRAV 1.025   LEUKOCYTESUR Negative   UROBILINOGEN 0.2   BILIRUBINUR SMALL*   BLOODU MODERATE*   GLUCOSEU >=1000*       Rad:  US ABDOMEN LIMITED   Final Result   IMPRESSION :      Enlarged fatty liver. XR ABDOMEN (KUB) (SINGLE AP VIEW)   Final Result      Nonspecific bowel gas pattern. XR CHEST (2 VW)   Final Result      No acute disease.              ASSESSMENT AND PLAN:   Carly Galaviz is a 39 y.o. female, w/ pmhx of T2DM and obesity presenting with DKA.     DKA 2/2 uncontrolled DMT2  Patient endorses not being able to afford her insulin , not taking her metformin daily. Patient reports being diagnosed 6 months ago. Presented with N/V, polyuria ,polydypsia, Urinalysis shows glucose greater than 1000 with ketones greater than 80 and negative. Blood sugar 361 Anion gap 32 , beta-hydroxybutyrate 7.34  - BMP daily  - glucose checks  - insulin gtt transitioned to insulin SubQ after AG closed x2  - Patient tolerates diet' no nausea, vomiting, abdominal pain  - EKG WNL   - Lactic acid normalized   - S/p Insulin NPH 10 U once     DANNA likely pre-renal (Resolved)   Cr. At 1.5 , baseline appears to be at 1, patient appears volume dry  - monitor UOP   - Strict I&Os     Leukocytosis (Resolved)  Sirs Positive Leukocytosis & Tachycardic   WBC at 24.7, patient afebrile, will r/o infectious etiology, Patient does endorse some RUQ pain and diffuse abdominal pain    -normalized at 10.6 today  -Likely 2/2 DKA and dehydration   -F/u Blood cultures  -If patient spikes fever, consider broad abx   -UA not suggestive of UTI   -dc zosyn         Obesity    - Complicating assessment and treatment.       Code Status:Prior  FEN: Carb control diet  PPX: Lovenox 40 daily  DISPO: Patient will be discharge today  This patient has been staffed and discussed with Morro Hendricks MD.  -----------------------------  Trinidad Oppenheim, MD, PGY-1  Internal Medicine    Pulm/CC    Patient seen and examined. I agree with Dr. Weems Spearing history, physical, lab findings, assessment and plan. DKA resolved overnight patient transition to Lantus. Creatinine has returned to normal at 0.8. Patient mentions that she knows the importance of using her insulin and states that she will be able to get access to it.     Patient discharged home per hospitalist service    Morro Hendricks MD

## 2021-03-09 NOTE — DISCHARGE SUMMARY
INTERNAL MEDICINE DEPARTMENT AT [address] [place] [address]  DISCHARGE SUMMARY    Patient ID: Merlyn Age                                             Discharge Date: 3/9/2021   Patient's PCP: BARBY Hardwick - CNP                                          Discharge Physician: Mercy Vega MD  Admit Date: 3/7/2021   Admitting Physician: Nate Schneider MD    DISCHARGE DIAGNOSES:  1- DKA 2/2 medication non-compliance  2- DANNA 2/2 DKA and dehydration    Hospital Course:    82-year-old female with history of type 2 diabetes, morbidly obese, presented to ED with complaint of nausea, vomiting, dizziness generalized fatigue, associated abdominal pain, unable to tolerate p.o. intake, increased thirst, increased frequency of urination. She felt so weak that she felt she might pass out. She denied cough, congestion, any recent infection, fever, chills, rigors or changes in bowel movements. She said she was started on insulin a few months ago but has been unable to afford it as her co-pay is around $100 per month. She says she recently saw a new primary care doctor in the [facility] medical office building, started on insulin, dapagliflozin and Metformin.     Work-up in the ED showed significant sinus tachycardia, metabolic acidosis with pH of 7.17, bicarb of 10, and acute kidney injury BUN of 22 creatinine 1.5, lactic acid 2.9. She did have significant leukocytosis 24.7 with left shift. Patient admitted to the intensive care unit for further evaluation and management of diabetic ketoacidosis. The patient was started on insulin drip, BMP Q4hr, glucose Q1hr, kept NPO and given IVF. Once the anion gap closed x2, we overlapped SubQ heparin (Lantus) for two hours before we discontinued the insulin drip. Patient was restarted on a carb diet which she tolerates. She denied any abdominal pain, nausea, vomiting. Patient also had DANNA 2/2 DKA and dehydration that were resolved with IVF and treatment of DKA.     On the date of discharge, the patient reported feeling stable. The patient was found to not be in any acute distress, with vital signs within normal limits, and no new abnormalities on physical examination. Further, the patient expressed appropriate understanding of, and agreement with, the discharge recommendations, medications, and plan. Physical Exam:  BP (!) 155/71   Pulse 99   Temp 98.7 °F (37.1 °C) (Oral)   Resp 19   Ht 5' 9\" (1.753 m)   Wt 264 lb 15.9 oz (120.2 kg)   SpO2 100%   BMI 39.13 kg/m²   General appearance: alert, appears stated age and cooperative  Head: Normocephalic  Eyes: conjunctivae/corneas clear. PERRL, EOM's intact  Ears: normal external ears  Neck: no tenderness/mass/nodules  Lungs: clear to auscultation bilaterally  Heart: no murmur, click, rub or gallop  Abdomen: soft, non-tender; bowel sounds normal  Extremities:, no cyanosis or edema  Neurologic: Grossly normal    Consults: none  Significant Diagnostic Studies:   US ABDOMEN LIMITED   Final Result   IMPRESSION :      Enlarged fatty liver. XR ABDOMEN (KUB) (SINGLE AP VIEW)   Final Result      Nonspecific bowel gas pattern. XR CHEST (2 VW)   Final Result      No acute disease. Disposition: home  Discharged Condition: Stable  Follow Up: Primary Care Physician in one week    DISCHARGE MEDICATION:     Medication List      START taking these medications    amLODIPine 5 MG tablet  Commonly known as: NORVASC  Take 1 tablet by mouth daily  Start taking on: March 10, 2021        CHANGE how you take these medications    metFORMIN 1000 MG tablet  Commonly known as: GLUCOPHAGE  Take 1 tablet by mouth 2 times daily (with meals)  What changed: Another medication with the same name was removed. Continue taking this medication, and follow the directions you see here.         CONTINUE taking these medications    Alcohol Pads 70 % Pads  1 box by Does not apply route 4 times daily     * blood glucose test strips  Test 3 times a day & as needed for symptoms of irregular blood glucose. Dispense sufficient amount for indicated testing frequency plus additional to accommodate PRN testing needs. * blood glucose test strips  Test 2 times a day & as needed for symptoms of irregular blood glucose. Dispense sufficient amount for indicated testing frequency plus additional to accommodate PRN testing needs. * blood glucose test strips  Test 2 times a day & as needed for symptoms of irregular blood glucose. Dispense sufficient amount for indicated testing frequency plus additional to accommodate PRN testing needs. * OneTouch Verio strip  Generic drug: blood glucose test strips  1 each by In Vitro route 3 times daily As needed. dapagliflozin 10 MG tablet  Commonly known as: Farxiga  Take 1 tablet by mouth every morning     glucose monitoring kit monitoring kit  1 kit by Does not apply route daily     ibuprofen 800 MG tablet  Commonly known as: ADVIL;MOTRIN  Take 1 tablet by mouth every 8 hours as needed for Pain or Fever     Insulin Degludec 200 UNIT/ML Sopn  Inject 10 Units into the skin nightly     * [employer] Pen Needles 31G 31G X 8 MM Misc  Generic drug: Insulin Pen Needle  1 each by Does not apply route daily     * [employer] Pen Needles 31G 31G X 8 MM Misc  Generic drug: Insulin Pen Needle  1 each by Does not apply route daily     * Lancets Misc  1 each by Does not apply route 2 times daily     * [employer] Lancets Misc  1 each by Does not apply route daily         * This list has 8 medication(s) that are the same as other medications prescribed for you. Read the directions carefully, and ask your doctor or other care provider to review them with you.             STOP taking these medications    glipiZIDE-metformin 2.5-500 MG per tablet  Commonly known as: METAGLIP           Where to Get Your Medications      You can get these medications from any pharmacy    Bring a paper prescription for each of these medications  · amLODIPine 5 MG tablet  · dapagliflozin 10 MG tablet  · Insulin Degludec 200 UNIT/ML Sopn  · metFORMIN 1000 MG tablet       Activity: activity as tolerated  Diet: diabetic diet  Wound Care: none needed    Time Spent on discharge is more than 30 minutes    Signed:  Janet Landaverde MD   3/9/2021  1:50 PM

## 2021-03-09 NOTE — PROGRESS NOTES
Patient is alert and oriented x 4. Vitals are stable and consistent. Insulin gtt remains offf per orders, subcutaneous insulin given per orders and sliding scale. Patient tolerated breakfast, denies nausea/vomiting. Patient denies pain. Patient ambulating to the restroom with a steady gait, urine output is WNL. Patient resting comfortably in bed at this time. Will continue to monitor.

## 2021-03-09 NOTE — PROGRESS NOTES
Shift handoff, patient alert and oriented x 4, able to follow commands and move all extremities, VSS, voiding in BR without issues, tolerating ice, no N/V noted, insulin gtt parked at 6 with BS tolerating well. Bed in lowest position, call light in place and bed alarm on. Will continue to monitor.

## 2021-03-09 NOTE — FLOWSHEET NOTE
03/09/21 0933   Encounter Summary   Services provided to: Patient   Referral/Consult From: Misha   Continue Visiting   (3/9/2021, NAVYA. )   Complexity of Encounter Moderate   Length of Encounter 15 minutes   Routine   Type Initial   Assessment Approachable   Intervention Nurtured hope   Outcome Expressed gratitude

## 2021-03-10 ENCOUNTER — TELEPHONE (OUTPATIENT)
Dept: FAMILY MEDICINE CLINIC | Age: 46
End: 2021-03-10

## 2021-03-10 NOTE — TELEPHONE ENCOUNTER
[facility] Initial Follow Up Call    Outreach made within 2 business days of discharge: Yes    Patient: Magalys Castillo Patient : 1975   MRN: <[id number]>  Reason for Admission: There are no discharge diagnoses documented for the most recent discharge. Discharge Date: 3/9/21       Spoke with: patient has been in contact with PCP office.   HFU scheduled for     Discharge department/facility: [facility]    Scheduled appointment with PCP within 7-14 days    Follow Up  Future Appointments   Date Time Provider Ashish Riley   3/18/2021  3:15 PM BARBY Smith CNP [facility]   2021 11:30 AM Bharathisally Ridley APRN - CNP [facility]       Nadine Marvin, [address] Denson

## 2021-03-10 NOTE — TELEPHONE ENCOUNTER
She was in ICU w/ketoacidosis. She has been taking Metformin 1000 mgs bid - it upsets her stomach - diarrhea all night. On insulin too. She would like to talk to you.    She set up hospital follow up appt for 3/18.    [phone]

## 2021-03-11 LAB
BLOOD CULTURE, ROUTINE: NORMAL
CULTURE, BLOOD 2: NORMAL

## 2021-08-10 ENCOUNTER — OFFICE VISIT (OUTPATIENT)
Dept: INTERNAL MEDICINE CLINIC | Age: 46
End: 2021-08-10
Payer: COMMERCIAL

## 2021-08-10 ENCOUNTER — NURSE TRIAGE (OUTPATIENT)
Dept: OTHER | Facility: CLINIC | Age: 46
End: 2021-08-10

## 2021-08-10 VITALS
HEART RATE: 104 BPM | OXYGEN SATURATION: 98 % | WEIGHT: 255 LBS | DIASTOLIC BLOOD PRESSURE: 78 MMHG | SYSTOLIC BLOOD PRESSURE: 117 MMHG | BODY MASS INDEX: 37.66 KG/M2

## 2021-08-10 DIAGNOSIS — B37.31 VAGINAL YEAST INFECTION: ICD-10-CM

## 2021-08-10 DIAGNOSIS — Z79.4 TYPE 2 DIABETES MELLITUS WITH HYPERGLYCEMIA, WITH LONG-TERM CURRENT USE OF INSULIN (HCC): Primary | ICD-10-CM

## 2021-08-10 DIAGNOSIS — E11.65 TYPE 2 DIABETES MELLITUS WITH HYPERGLYCEMIA, WITH LONG-TERM CURRENT USE OF INSULIN (HCC): Primary | ICD-10-CM

## 2021-08-10 PROBLEM — E11.9 NEW ONSET TYPE 2 DIABETES MELLITUS (HCC): Status: RESOLVED | Noted: 2020-08-20 | Resolved: 2021-08-10

## 2021-08-10 PROBLEM — E11.10 DKA, TYPE 2, NOT AT GOAL (HCC): Status: RESOLVED | Noted: 2021-03-07 | Resolved: 2021-08-10

## 2021-08-10 LAB — HBA1C MFR BLD: 14 %

## 2021-08-10 PROCEDURE — 2022F DILAT RTA XM EVC RTNOPTHY: CPT | Performed by: NURSE PRACTITIONER

## 2021-08-10 PROCEDURE — 99214 OFFICE O/P EST MOD 30 MIN: CPT | Performed by: NURSE PRACTITIONER

## 2021-08-10 PROCEDURE — 83036 HEMOGLOBIN GLYCOSYLATED A1C: CPT | Performed by: NURSE PRACTITIONER

## 2021-08-10 PROCEDURE — G8417 CALC BMI ABV UP PARAM F/U: HCPCS | Performed by: NURSE PRACTITIONER

## 2021-08-10 PROCEDURE — 3046F HEMOGLOBIN A1C LEVEL >9.0%: CPT | Performed by: NURSE PRACTITIONER

## 2021-08-10 PROCEDURE — 1036F TOBACCO NON-USER: CPT | Performed by: NURSE PRACTITIONER

## 2021-08-10 PROCEDURE — G8427 DOCREV CUR MEDS BY ELIG CLIN: HCPCS | Performed by: NURSE PRACTITIONER

## 2021-08-10 RX ORDER — FLUCONAZOLE 150 MG/1
150 TABLET ORAL ONCE
Qty: 1 TABLET | Refills: 0 | Status: SHIPPED | OUTPATIENT
Start: 2021-08-10 | End: 2021-08-10

## 2021-08-10 RX ORDER — BLOOD-GLUCOSE METER
KIT MISCELLANEOUS
Qty: 1 KIT | Refills: 0 | Status: SHIPPED | OUTPATIENT
Start: 2021-08-10

## 2021-08-10 RX ORDER — PEN NEEDLE, DIABETIC 31 GX5/16"
1 NEEDLE, DISPOSABLE MISCELLANEOUS DAILY
Qty: 100 EACH | Refills: 3 | Status: SHIPPED | OUTPATIENT
Start: 2021-08-10

## 2021-08-10 RX ORDER — GLUCOSAMINE HCL/CHONDROITIN SU 500-400 MG
CAPSULE ORAL
Qty: 100 STRIP | Refills: 0 | Status: SHIPPED | OUTPATIENT
Start: 2021-08-10

## 2021-08-10 RX ORDER — LANCETS
1 EACH MISCELLANEOUS DAILY
Qty: 100 EACH | Refills: 3 | Status: SHIPPED | OUTPATIENT
Start: 2021-08-10

## 2021-08-10 ASSESSMENT — ENCOUNTER SYMPTOMS
COLOR CHANGE: 0
NAUSEA: 1
DIARRHEA: 0
SINUS PAIN: 0
SINUS PRESSURE: 0
CONSTIPATION: 0
ABDOMINAL PAIN: 0
BACK PAIN: 0
WHEEZING: 0
COUGH: 0
SHORTNESS OF BREATH: 0

## 2021-08-10 ASSESSMENT — PATIENT HEALTH QUESTIONNAIRE - PHQ9
SUM OF ALL RESPONSES TO PHQ QUESTIONS 1-9: 2
1. LITTLE INTEREST OR PLEASURE IN DOING THINGS: 1
SUM OF ALL RESPONSES TO PHQ9 QUESTIONS 1 & 2: 2
SUM OF ALL RESPONSES TO PHQ QUESTIONS 1-9: 2
2. FEELING DOWN, DEPRESSED OR HOPELESS: 1
SUM OF ALL RESPONSES TO PHQ QUESTIONS 1-9: 2

## 2021-08-10 NOTE — PROGRESS NOTES
Griselda Camacho (:  1975) is a 39 y.o. female,Established patient, here for evaluation of the following chief complaint(s):  Diabetes and Vaginal Itching (Rash, itching anout a week)      ASSESSMENT/PLAN:  1. Type 2 diabetes mellitus with hyperglycemia, with long-term current use of insulin ([facility], [facility])  Assessment & Plan:  Not controlled   A1c > 14   Check BMP   Restart Farxiga   Increase lantus to 12 units x 2 weeks, if BS remain above 200 increase to 14 units   Follow up in 4 weeks   Discussed importance of monitoring BS, bring log to next visit   Discussed diet and exercise     Orders:  -     Hemoglobin A1C; Future  -     BASIC METABOLIC PANEL; Future  2. Vaginal yeast infection  Assessment & Plan:  Diflucan x 1 dose   Call if symptoms worsen or fail to improve        No follow-ups on file. SUBJECTIVE/OBJECTIVE:  HPI  Patient is here for concern of elevated BS. She has not been seen for her DM in some time. She checked her blood sugar a few days ago and it was over 400. She has been experiencing polydipsia, polyuria, and nausea. She denies vomiting. She is able to tolerate foods. She is taking lantus and metformin. She does not consistently check BS at home. She also reports vaginal itching and thick white discharge that has been ongoing for a few weeks. Current Outpatient Medications   Medication Sig Dispense Refill    Insulin Pen Needle ([employer] PEN NEEDLES 31G) 31G X 8 MM MISC 1 each by Does not apply route daily 100 each 3    dapagliflozin (FARXIGA) 10 MG tablet Take 1 tablet by mouth every morning 90 tablet 1    fluconazole (DIFLUCAN) 150 MG tablet Take 1 tablet by mouth once for 1 dose 1 tablet 0    glucose monitoring (FREESTYLE) kit Test blood sugars 4 times daily 1 kit 0    [employer] Lancets MISC 1 each by Does not apply route daily 100 each 3    blood glucose monitor strips Test 2 times a day & as needed for symptoms of irregular blood glucose.  Dispense sufficient amount for indicated for dizziness, syncope, weakness, light-headedness and headaches. Psychiatric/Behavioral: Negative for behavioral problems, confusion and sleep disturbance. The patient is not nervous/anxious. Vitals:    08/10/21 1538   BP: 117/78   Site: Left Upper Arm   Position: Sitting   Cuff Size: Large Adult   Pulse: 104   SpO2: 98%   Weight: 255 lb (115.7 kg)       Physical Exam  Constitutional:       Appearance: She is well-developed. HENT:      Head: Normocephalic and atraumatic. Eyes:      Conjunctiva/sclera: Conjunctivae normal.      Pupils: Pupils are equal, round, and reactive to light. Neck:      Thyroid: No thyromegaly. Vascular: No JVD. Cardiovascular:      Rate and Rhythm: Normal rate and regular rhythm. Heart sounds: Normal heart sounds. Pulmonary:      Effort: Pulmonary effort is normal. No respiratory distress. Breath sounds: Normal breath sounds. No wheezing. Musculoskeletal:         General: No deformity. Normal range of motion. Cervical back: Normal range of motion and neck supple. Skin:     General: Skin is warm and dry. Capillary Refill: Capillary refill takes less than 2 seconds. Neurological:      Mental Status: She is alert and oriented to person, place, and time. Psychiatric:         Behavior: Behavior normal.         An electronic signature was used to authenticate this note.     --BARBY Robins - CNP

## 2021-08-10 NOTE — TELEPHONE ENCOUNTER
Reason for Disposition   Symptoms of high blood sugar (e.g., frequent urination, weak, weight loss) and not able to test blood glucose    Answer Assessment - Initial Assessment Questions  1. BLOOD GLUCOSE: \"What is your blood glucose level? \"       Most recently     2. ONSET: \"When did you check the blood glucose? \"      2 days ago-     3. USUAL RANGE: \"What is your glucose level usually? \" (e.g., usual fasting morning value, usual evening value)      States usually under 200    4. KETONES: \"Do you check for ketones (urine or blood test strips)? \" If yes, ask: \"What does the test show now? \"       Denies    5. TYPE 1 or 2:  \"Do you know what type of diabetes you have? \"  (e.g., Type 1, Type 2, Gestational; doesn't know)       Type 2 diabetes    6. INSULIN: \"Do you take insulin? \" \"What type of insulin(s) do you use? What is the mode of delivery? (syringe, pen; injection or pump)? \"       On long-acting insulin, last took 3 days ago    7. DIABETES PILLS: \"Do you take any pills for your diabetes? \" If yes, ask: \"Have you missed taking any pills recently? \"      Metformin, has not missed any doses    8. OTHER SYMPTOMS: \"Do you have any symptoms? \" (e.g., fever, frequent urination, difficulty breathing, dizziness, weakness, vomiting)      Increased thirst, frequent urination, intermittent dizziness    9. PREGNANCY: \"Is there any chance you are pregnant? \" \"When was your last menstrual period? \"      Denies. LMP - now    Protocols used: DIABETES - HIGH BLOOD SUGAR-ADULT-[place]    Received call from Gerardo Dang at [facility] with [employer]. Brief description of triage: Patient experiencing increased thirst, frequent urination, intermittent dizziness.   about 2 days ago, does not have access    Triage indicates for patient to be seen today/ [facility] [place] [facility] [place]    Care advice provided, patient verbalizes understanding; denies any other questions or concerns; instructed to call back for any new or worsening symptoms. Writer provided warm transfer to [facility] at [facility] for appointment scheduling. Attention Provider: Thank you for allowing me to participate in the care of your patient. The patient was connected to triage in response to information provided to the E[facility]. Please do not respond through this encounter as the response is not directed to a shared pool.

## 2021-08-11 ENCOUNTER — TELEPHONE (OUTPATIENT)
Dept: FAMILY MEDICINE CLINIC | Age: 46
End: 2021-08-11

## 2021-08-11 ENCOUNTER — TELEPHONE (OUTPATIENT)
Dept: INTERNAL MEDICINE CLINIC | Age: 46
End: 2021-08-11

## 2021-08-11 DIAGNOSIS — Z79.4 TYPE 2 DIABETES MELLITUS WITH HYPERGLYCEMIA, WITH LONG-TERM CURRENT USE OF INSULIN (HCC): Primary | ICD-10-CM

## 2021-08-11 DIAGNOSIS — E11.65 TYPE 2 DIABETES MELLITUS WITH HYPERGLYCEMIA, WITH LONG-TERM CURRENT USE OF INSULIN (HCC): Primary | ICD-10-CM

## 2021-08-11 RX ORDER — BLOOD-GLUCOSE METER
1 EACH MISCELLANEOUS DAILY
Qty: 1 KIT | Refills: 0 | Status: SHIPPED | OUTPATIENT
Start: 2021-08-11

## 2021-08-11 NOTE — TELEPHONE ENCOUNTER
Pt's lancets say test once daily  Her strips say test 2x/daily   Her meter says 4x/daily  Leonila calling for alec

## 2021-08-11 NOTE — TELEPHONE ENCOUNTER
Pt said that her sugar is high, pt said that she is dizzy. Pt increased her dose and is still having dizziness. Pt is requesting a call from [address] [place] [address] in regards to additional dosage.

## 2021-08-11 NOTE — TELEPHONE ENCOUNTER
Sent in script for one touch meter. She should be checking them 4x daily   She can have whatever strips or lancers are covered.

## 2022-04-12 ENCOUNTER — HOSPITAL ENCOUNTER (EMERGENCY)
Age: 47
Discharge: HOME | End: 2022-04-12
Attending: EMERGENCY MEDICINE
Payer: COMMERCIAL

## 2022-04-12 VITALS
OXYGEN SATURATION: 97 % | SYSTOLIC BLOOD PRESSURE: 119 MMHG | RESPIRATION RATE: 14 BRPM | BODY MASS INDEX: 35.43 KG/M2 | WEIGHT: 233.8 LBS | TEMPERATURE: 98.1 F | DIASTOLIC BLOOD PRESSURE: 78 MMHG | HEIGHT: 68 IN | HEART RATE: 89 BPM

## 2022-04-12 DIAGNOSIS — B96.89 BACTERIAL VAGINOSIS: ICD-10-CM

## 2022-04-12 DIAGNOSIS — R73.9 HYPERGLYCEMIA WITHOUT KETOSIS: Primary | ICD-10-CM

## 2022-04-12 DIAGNOSIS — E11.65 TYPE 2 DIABETES MELLITUS WITH HYPERGLYCEMIA, WITH LONG-TERM CURRENT USE OF INSULIN (HCC): ICD-10-CM

## 2022-04-12 DIAGNOSIS — N76.0 VAGINITIS AND VULVOVAGINITIS: ICD-10-CM

## 2022-04-12 DIAGNOSIS — Z79.4 TYPE 2 DIABETES MELLITUS WITH HYPERGLYCEMIA, WITH LONG-TERM CURRENT USE OF INSULIN (HCC): ICD-10-CM

## 2022-04-12 DIAGNOSIS — E11.9 NEW ONSET TYPE 2 DIABETES MELLITUS (HCC): ICD-10-CM

## 2022-04-12 DIAGNOSIS — N76.0 BACTERIAL VAGINOSIS: ICD-10-CM

## 2022-04-12 LAB
A/G RATIO: 1.4 (ref 1.1–2.2)
ALBUMIN SERPL-MCNC: 4.6 G/DL (ref 3.4–5)
ALP BLD-CCNC: 202 U/L (ref 40–129)
ALT SERPL-CCNC: 52 U/L (ref 10–40)
ANION GAP SERPL CALCULATED.3IONS-SCNC: 11 MMOL/L (ref 3–16)
AST SERPL-CCNC: 29 U/L (ref 15–37)
BACTERIA WET PREP: ABNORMAL
BASE EXCESS VENOUS: 3 MMOL/L (ref -3–3)
BASOPHILS ABSOLUTE: 0.1 K/UL (ref 0–0.2)
BASOPHILS RELATIVE PERCENT: 0.7 %
BILIRUB SERPL-MCNC: <0.2 MG/DL (ref 0–1)
BILIRUBIN URINE: NEGATIVE
BLOOD, URINE: NEGATIVE
BUN BLDV-MCNC: 11 MG/DL (ref 7–20)
CALCIUM SERPL-MCNC: 10.1 MG/DL (ref 8.3–10.6)
CHLORIDE BLD-SCNC: 95 MMOL/L (ref 99–110)
CLARITY: CLEAR
CLUE CELLS: ABNORMAL
CO2: 26 MMOL/L (ref 21–32)
COLOR: YELLOW
CREAT SERPL-MCNC: 0.7 MG/DL (ref 0.6–1.1)
EOSINOPHILS ABSOLUTE: 0.1 K/UL (ref 0–0.6)
EOSINOPHILS RELATIVE PERCENT: 0.8 %
EPITHELIAL CELLS WET PREP: ABNORMAL
GFR AFRICAN AMERICAN: >60
GFR NON-AFRICAN AMERICAN: >60
GLUCOSE BLD-MCNC: 230 MG/DL (ref 70–99)
GLUCOSE BLD-MCNC: 436 MG/DL (ref 70–99)
GLUCOSE BLD-MCNC: 461 MG/DL (ref 70–99)
GLUCOSE URINE: >=1000 MG/DL
HCG(URINE) PREGNANCY TEST: NEGATIVE
HCO3 VENOUS: 29 MMOL/L (ref 23–29)
HCT VFR BLD CALC: 42 % (ref 36–48)
HEMOGLOBIN: 13.6 G/DL (ref 12–16)
KETONES, URINE: NEGATIVE MG/DL
LACTIC ACID: 1 MMOL/L (ref 0.4–2)
LEUKOCYTE ESTERASE, URINE: NEGATIVE
LYMPHOCYTES ABSOLUTE: 2.1 K/UL (ref 1–5.1)
LYMPHOCYTES RELATIVE PERCENT: 20.5 %
MCH RBC QN AUTO: 25 PG (ref 26–34)
MCHC RBC AUTO-ENTMCNC: 32.4 G/DL (ref 31–36)
MCV RBC AUTO: 77.2 FL (ref 80–100)
MICROSCOPIC EXAMINATION: ABNORMAL
MONOCYTES ABSOLUTE: 0.7 K/UL (ref 0–1.3)
MONOCYTES RELATIVE PERCENT: 7.1 %
NEUTROPHILS ABSOLUTE: 7.4 K/UL (ref 1.7–7.7)
NEUTROPHILS RELATIVE PERCENT: 70.9 %
NITRITE, URINE: NEGATIVE
O2 SAT, VEN: 63 %
O2 THERAPY: ABNORMAL
PCO2, VEN: 55.5 MMHG (ref 40–50)
PDW BLD-RTO: 15 % (ref 12.4–15.4)
PERFORMED ON: ABNORMAL
PERFORMED ON: ABNORMAL
PH UA: 6 (ref 5–8)
PH VENOUS: 7.33 (ref 7.35–7.45)
PLATELET # BLD: 278 K/UL (ref 135–450)
PMV BLD AUTO: 9.5 FL (ref 5–10.5)
PO2, VEN: 35.6 MMHG (ref 25–40)
POTASSIUM REFLEX MAGNESIUM: 4.2 MMOL/L (ref 3.5–5.1)
PROTEIN UA: NEGATIVE MG/DL
RBC # BLD: 5.44 M/UL (ref 4–5.2)
RBC WET PREP: ABNORMAL
SODIUM BLD-SCNC: 132 MMOL/L (ref 136–145)
SOURCE WET PREP: ABNORMAL
SPECIFIC GRAVITY UA: 1.01 (ref 1–1.03)
TCO2 CALC VENOUS: 30 MMOL/L
TOTAL PROTEIN: 7.8 G/DL (ref 6.4–8.2)
TRICHOMONAS PREP: ABNORMAL
URINE TYPE: ABNORMAL
UROBILINOGEN, URINE: 0.2 E.U./DL
WBC # BLD: 10.4 K/UL (ref 4–11)
WBC WET PREP: ABNORMAL
YEAST WET PREP: ABNORMAL

## 2022-04-12 PROCEDURE — 96374 THER/PROPH/DIAG INJ IV PUSH: CPT

## 2022-04-12 PROCEDURE — 87210 SMEAR WET MOUNT SALINE/INK: CPT

## 2022-04-12 PROCEDURE — 6370000000 HC RX 637 (ALT 250 FOR IP): Performed by: EMERGENCY MEDICINE

## 2022-04-12 PROCEDURE — 83605 ASSAY OF LACTIC ACID: CPT

## 2022-04-12 PROCEDURE — 87591 N.GONORRHOEAE DNA AMP PROB: CPT

## 2022-04-12 PROCEDURE — 87252 VIRUS INOCULATION TISSUE: CPT

## 2022-04-12 PROCEDURE — 2580000003 HC RX 258: Performed by: EMERGENCY MEDICINE

## 2022-04-12 PROCEDURE — 82803 BLOOD GASES ANY COMBINATION: CPT

## 2022-04-12 PROCEDURE — 80053 COMPREHEN METABOLIC PANEL: CPT

## 2022-04-12 PROCEDURE — 99284 EMERGENCY DEPT VISIT MOD MDM: CPT

## 2022-04-12 PROCEDURE — 87253 VIRUS INOCULATE TISSUE ADDL: CPT

## 2022-04-12 PROCEDURE — 85025 COMPLETE CBC W/AUTO DIFF WBC: CPT

## 2022-04-12 PROCEDURE — 87491 CHLMYD TRACH DNA AMP PROBE: CPT

## 2022-04-12 PROCEDURE — 81003 URINALYSIS AUTO W/O SCOPE: CPT

## 2022-04-12 PROCEDURE — 99283 EMERGENCY DEPT VISIT LOW MDM: CPT

## 2022-04-12 PROCEDURE — 84703 CHORIONIC GONADOTROPIN ASSAY: CPT

## 2022-04-12 RX ORDER — METRONIDAZOLE 500 MG/1
500 TABLET ORAL 2 TIMES DAILY
Qty: 14 TABLET | Refills: 0 | Status: SHIPPED | OUTPATIENT
Start: 2022-04-12 | End: 2022-04-19

## 2022-04-12 RX ORDER — NYSTATIN AND TRIAMCINOLONE ACETONIDE 100000; 1 [USP'U]/G; MG/G
OINTMENT TOPICAL
Qty: 30 G | Refills: 0 | Status: SHIPPED | OUTPATIENT
Start: 2022-04-12

## 2022-04-12 RX ORDER — 0.9 % SODIUM CHLORIDE 0.9 %
1000 INTRAVENOUS SOLUTION INTRAVENOUS ONCE
Status: COMPLETED | OUTPATIENT
Start: 2022-04-12 | End: 2022-04-12

## 2022-04-12 RX ADMIN — SODIUM CHLORIDE 1000 ML: 9 INJECTION, SOLUTION INTRAVENOUS at 16:07

## 2022-04-12 RX ADMIN — INSULIN HUMAN 10 UNITS: 100 INJECTION, SOLUTION PARENTERAL at 16:27

## 2022-04-12 ASSESSMENT — PAIN SCALES - GENERAL: PAINLEVEL_OUTOF10: 8

## 2022-04-12 ASSESSMENT — PAIN DESCRIPTION - DESCRIPTORS: DESCRIPTORS: ACHING;BURNING

## 2022-04-12 ASSESSMENT — PAIN DESCRIPTION - LOCATION: LOCATION: VAGINA

## 2022-04-12 ASSESSMENT — PAIN DESCRIPTION - PAIN TYPE: TYPE: ACUTE PAIN

## 2022-04-12 NOTE — ED NOTES
Patient given prescription, discharge instructions verbal and written, patient verbalized understanding. Alert/oriented X4, Clear speech.   Patient exhibits no distress, ambulates with steady gait per self leaving unit, no further request.     Mabel Dandy, RN  04/12/22 8940

## 2022-04-12 NOTE — ED PROVIDER NOTES
CRITICAL CARE NOTE  There was a high probability of clinical significant / life threatening deterioration in the patient's condition which required my urgent intervention. Total critical care time was at least 31  minutes excluding any separately reported procedures. TRIAGE CHIEF COMPLAINT:   Chief Complaint   Patient presents with    Vaginal Pain    Hyperglycemia       HPI: Jennifer Amezquita is a 42 y.o. female who presents to the emergency department with complaint of vaginal pain/rectal pain with some burning and skin irritation in the area. She states she has had some slight dysuria. She feels like she may have some bumps in the skin on the bilateral vaginal area. She states she does have an abnormal vaginal discharge. Denies flank pain. She has no abdominal pain. She denies previous history of genital herpes. Patient has a history of DM 2 on Metformin, insulin and Jonathon Labor but states she ran out of all of her insulin medications at least a week ago. She is concerned that her blood sugar may be elevated. REVIEW OF SYSTEMS:   10 systems reviewed. Pertinent positives per HPI. Otherwise noted to be negative. I have reviewed the triage/nursing documentation and agree unless otherwise noted below. PAST MEDICAL HISTORY:   Past Medical History:   Diagnosis Date    Diabetes mellitus ([facility] [address]     Hypertension         CURRENT MEDICATIONS:   Patient's Medications   New Prescriptions    No medications on file   Previous Medications    ALCOHOL SWABS (ALCOHOL PADS) 70 % PADS    1 box by Does not apply route 4 times daily    AMLODIPINE (NORVASC) 5 MG TABLET    Take 1 tablet by mouth daily    BLOOD GLUCOSE MONITOR STRIPS    Test 3 times a day & as needed for symptoms of irregular blood glucose. Dispense sufficient amount for indicated testing frequency plus additional to accommodate PRN testing needs. BLOOD GLUCOSE MONITOR STRIPS    Test 2 times a day & as needed for symptoms of irregular blood glucose. Dispense sufficient amount for indicated testing frequency plus additional to accommodate PRN testing needs. BLOOD GLUCOSE MONITOR STRIPS    Test 2 times a day & as needed for symptoms of irregular blood glucose. Dispense sufficient amount for indicated testing frequency plus additional to accommodate PRN testing needs. BLOOD GLUCOSE MONITORING SUPPL (ONE TOUCH ULTRA 2) W/DEVICE KIT    1 kit by Does not apply route daily    BLOOD GLUCOSE TEST STRIPS (ONETOUCH VERIO) STRIP    1 each by In Vitro route 3 times daily As needed. DAPAGLIFLOZIN (FARXIGA) 10 MG TABLET    Take 1 tablet by mouth every morning    GLUCOSE MONITORING (FREESTYLE) KIT    Test blood sugars 4 times daily    IBUPROFEN (ADVIL;MOTRIN) 800 MG TABLET    Take 1 tablet by mouth every 8 hours as needed for Pain or Fever    INSULIN DEGLUDEC 200 UNIT/ML SOPN    Inject 10 Units into the skin nightly    INSULIN PEN NEEDLE ([employer] PEN NEEDLES 31G) 31G X 8 MM MISC    1 each by Does not apply route daily    INSULIN PEN NEEDLE ([employer] PEN NEEDLES 31G) 31G X 8 MM MISC    1 each by Does not apply route daily    [employer] LANCETS MISC    1 each by Does not apply route daily    LANCETS MISC    1 each by Does not apply route 2 times daily    METFORMIN (GLUCOPHAGE) 1000 MG TABLET    Take 1 tablet by mouth 2 times daily (with meals)   Modified Medications    No medications on file   Discontinued Medications    No medications on file        SURGICAL HISTORY:       Procedure Laterality Date    APPENDECTOMY          FAMILY HISTORY:       Problem Relation Age of Onset    Asthma Mother     Diabetes Mother     Heart Disease Mother         SOCIAL HISTORY:    reports that she has never smoked. She has never used smokeless tobacco. She reports that she does not drink alcohol and does not use drugs.     ALLERGIES:   Allergies   Allergen Reactions    Risperidone Other (See Comments)     Hand tremors and tongue tremor, rigidity       PHYSICAL EXAM:  VITAL SIGNS: /78 Pulse 89   Temp 98.1 °F (36.7 °C) (Oral)   Resp 14   Ht 5' 8\" (1.727 m)   Wt 233 lb 12.8 oz (106.1 kg)   SpO2 97%   BMI 35.55 kg/m²   Constitutional:  No acute distress, Non-toxic appearance  HENT: Normocephalic, Atraumatic Oropharynx moist, No oral exudates. TMs are normal.  Eyes:  PERRL, EOMI, Conjunctiva normal, No discharge. Neck: No tenderness, Supple, No lymphadenopathy, No stridor. Cardiovascular:  Normal heart rate, Normal rhythm, No murmurs, No rubs, No gallops. Pulmonary/Chest:  Normal breath sounds, No respiratory distress, No wheezing,  Abdomen:   Soft, No tenderness, No masses, No pulsatile masses  Back:  No tenderness, No CVA tenderness  Extremities:  Normal range of motion, Intact distal pulses, No edema, No tenderness  Neurologic:  Alert & oriented x 3, Speech is clear and appropriate, No upper extremity drift or lower extremity weakness,  Normal sensory function, No facial asymmetry, no truncal or extremity ataxia. Normal gait. Skin:  Warm, Dry, No erythema, No rash  Psychiatric:  Affect normal, Mood normal  Pelvic exam done by myself with female chaperone present shows some mild bilateral labial swelling with some skin thickening. There is mild diffuse tenderness but no ulcerations or vesicles seen. She has thick white vaginal discharge. No bleeding. No cervical motion or uterine tenderness. EKG:    EKG interpreted by myself.      Radiology:      LAB  Labs Reviewed   WET PREP, GENITAL - Abnormal; Notable for the following components:       Result Value    Clue Cells, Wet Prep <1+ (*)     All other components within normal limits   URINALYSIS - Abnormal; Notable for the following components:    Glucose, Ur >=1000 (*)     All other components within normal limits   CBC WITH AUTO DIFFERENTIAL - Abnormal; Notable for the following components:    RBC 5.44 (*)     MCV 77.2 (*)     MCH 25.0 (*)     All other components within normal limits   COMPREHENSIVE METABOLIC PANEL W/ REFLEX TO care.    (Please note that portions of this note may have been completed with a voice recognition program.  Efforts were made to edit the dictation but occasionally words are mis-transcribed)      FINAL IMPRESSION:  1 --hyperglycemia without ketosis  2 --vaginitis  3 --bacterial vaginosis                Jamila Tejada MD  04/13/22 [id number]

## 2022-04-14 LAB
C TRACH DNA GENITAL QL NAA+PROBE: NEGATIVE
N. GONORRHOEAE DNA: NEGATIVE

## 2022-04-15 LAB
FINAL REPORT: NORMAL
PRELIMINARY: NORMAL

## 2022-05-15 ENCOUNTER — HOSPITAL ENCOUNTER (EMERGENCY)
Age: 47
Discharge: HOME | End: 2022-05-15
Attending: EMERGENCY MEDICINE
Payer: COMMERCIAL

## 2022-05-15 VITALS
SYSTOLIC BLOOD PRESSURE: 128 MMHG | TEMPERATURE: 98 F | HEIGHT: 68 IN | RESPIRATION RATE: 14 BRPM | WEIGHT: 232 LBS | BODY MASS INDEX: 35.16 KG/M2 | OXYGEN SATURATION: 100 % | DIASTOLIC BLOOD PRESSURE: 80 MMHG | HEART RATE: 85 BPM

## 2022-05-15 DIAGNOSIS — H60.391 INFECTIVE OTITIS EXTERNA OF RIGHT EAR: ICD-10-CM

## 2022-05-15 DIAGNOSIS — K08.89 PAIN, DENTAL: Primary | ICD-10-CM

## 2022-05-15 PROCEDURE — 99283 EMERGENCY DEPT VISIT LOW MDM: CPT

## 2022-05-15 PROCEDURE — 6370000000 HC RX 637 (ALT 250 FOR IP): Performed by: EMERGENCY MEDICINE

## 2022-05-15 RX ORDER — PENICILLIN V POTASSIUM 250 MG/1
500 TABLET ORAL ONCE
Status: COMPLETED | OUTPATIENT
Start: 2022-05-15 | End: 2022-05-15

## 2022-05-15 RX ORDER — IBUPROFEN 600 MG/1
600 TABLET ORAL ONCE
Status: COMPLETED | OUTPATIENT
Start: 2022-05-15 | End: 2022-05-15

## 2022-05-15 RX ORDER — PENICILLIN V POTASSIUM 500 MG/1
500 TABLET ORAL 4 TIMES DAILY
Qty: 40 TABLET | Refills: 0 | Status: SHIPPED | OUTPATIENT
Start: 2022-05-15 | End: 2022-05-25

## 2022-05-15 RX ADMIN — PENICILLIN V POTASSIUM 500 MG: 250 TABLET, FILM COATED ORAL at 09:28

## 2022-05-15 RX ADMIN — IBUPROFEN 600 MG: 600 TABLET, FILM COATED ORAL at 09:28

## 2022-05-15 ASSESSMENT — PAIN SCALES - GENERAL
PAINLEVEL_OUTOF10: 8
PAINLEVEL_OUTOF10: 8

## 2022-05-15 ASSESSMENT — PAIN - FUNCTIONAL ASSESSMENT: PAIN_FUNCTIONAL_ASSESSMENT: 0-10

## 2022-05-15 ASSESSMENT — PAIN DESCRIPTION - ORIENTATION: ORIENTATION: RIGHT

## 2022-05-15 NOTE — Clinical Note
Cristina Kenyon was seen and treated in our emergency department on 5/15/2022. She may return to work on 05/17/2022. If you have any questions or concerns, please don't hesitate to call.       Renee Pike MD

## 2022-05-15 NOTE — ED PROVIDER NOTES
[id number] [address] ENCOUNTER      Pt Name: Buzz Ballesteros  MRN: [id number]  [place] 1975  Date of evaluation: 5/15/2022  Provider: Joana Gordillo MD    CHIEF COMPLAINT       Chief Complaint   Patient presents with    Dental Pain         HISTORY OF PRESENT ILLNESS   (Location/Symptom, Timing/Onset, Context/Setting, Quality, Duration, Modifying Factors, Severity)  Note limiting factors. Buzz Ballesteros is a 42 y.o. female with past medical history of hypertension and diabetes here today for dental pain and right ear pain. The patient states a few days ago she began to have a throbbing aching discomfort in the right upper jaw associated with a tooth ache. She states she has had some mild facial swelling and now she feels \"it may have spread to my ear\". She now states she is having some discharge drainage and throbbing aching discomfort from the right ear. No fevers or chills. No headache. No surrounding skin skin changes to the ear or head. Denies trauma or injury. She has no trouble swallowing. States her blood sugars have been running controlled    HPI    Nursing Notes were reviewed. REVIEW OF SYSTEMS    (2-9 systems for level 4, 10 or more for level 5)     Review of Systems    Please see HPI for pertinent positive and negative review of system findings. A full 10 system ROS was performed and otherwise negative.         PAST MEDICAL HISTORY     Past Medical History:   Diagnosis Date    Diabetes mellitus ([address]     Hypertension          SURGICAL HISTORY       Past Surgical History:   Procedure Laterality Date    APPENDECTOMY           CURRENT MEDICATIONS       Discharge Medication List as of 5/15/2022  9:34 AM      CONTINUE these medications which have NOT CHANGED    Details   nystatin-triamcinolone (MYCOLOG) 108017-0.1 UNIT/GM-% ointment Apply topically 2 times daily for 7-10 days, Disp-30 g, R-0, Normal      dapagliflozin (FARXIGA) 10 MG tablet Take 1 tablet by mouth every morning, Disp-90 tablet, R-0Normal      Insulin Degludec 200 UNIT/ML SOPN Inject 10 Units into the skin nightly, Disp-4 pen, R-0Normal      metFORMIN (GLUCOPHAGE) 1000 MG tablet Take 1 tablet by mouth 2 times daily (with meals), Disp-90 tablet, R-0Normal      Blood Glucose Monitoring Suppl (ONE TOUCH ULTRA 2) w/Device KIT DAILY Starting Wed 8/11/2021, Disp-1 kit, R-0, Normal      !! Insulin Pen Needle ([employer] PEN NEEDLES 31G) 31G X 8 MM MISC DAILY Starting Tue 8/10/2021, Disp-100 each, R-3, Normal      glucose monitoring (FREESTYLE) kit Disp-1 kit, R-0, NormalTest blood sugars 4 times daily      !! [employer] Lancets MISC DAILY Starting Tue 8/10/2021, Disp-100 each, R-3, Normal      !! blood glucose monitor strips Test 2 times a day & as needed for symptoms of irregular blood glucose. Dispense sufficient amount for indicated testing frequency plus additional to accommodate PRN testing needs. , Disp-100 strip, R-0, Normal      amLODIPine (NORVASC) 5 MG tablet Take 1 tablet by mouth daily, Disp-30 tablet, R-3Print      !! Insulin Pen Needle ([employer] PEN NEEDLES 31G) 31G X 8 MM MISC DAILY Starting u 3/4/2021, Disp-100 each, R-3, Normal      !! blood glucose test strips (ONETOUCH VERIO) strip 1 each by In Vitro route 3 times daily As needed. , Disp-100 each, R-3Normal      Alcohol Swabs (ALCOHOL PADS) 70 % PADS 4 TIMES DAILY Starting Wed 9/2/2020, Disp-1 each,R-2, Normal      !! Lancets MISC 2 TIMES DAILY Starting Fri 8/21/2020, Disp-300 each,R-1, Print      !! blood glucose monitor strips Test 2 times a day & as needed for symptoms of irregular blood glucose. Dispense sufficient amount for indicated testing frequency plus additional to accommodate PRN testing needs. , Disp-100 strip,R-0, Print      !! blood glucose monitor strips Test 3 times a day & as needed for symptoms of irregular blood glucose.  Dispense sufficient amount for indicated testing frequency plus additional to accommodate PRN testing needs., Disp-90 strip,R-0, Print      ibuprofen (ADVIL;MOTRIN) 800 MG tablet Take 1 tablet by mouth every 8 hours as needed for Pain or Fever, Disp-20 tablet, R-0Print       !! - Potential duplicate medications found. Please discuss with provider. ALLERGIES     Risperidone    FAMILY HISTORY       Family History   Problem Relation Age of Onset    Asthma Mother     Diabetes Mother     Heart Disease Mother           SOCIAL HISTORY       Social History     Socioeconomic History    Marital status: Single     Spouse name: None    Number of children: None    Years of education: None    Highest education level: None   Occupational History    None   Tobacco Use    Smoking status: Never Smoker    Smokeless tobacco: Never Used   Vaping Use    Vaping Use: Never used   Substance and Sexual Activity    Alcohol use: No    Drug use: No    Sexual activity: Yes     Partners: Male   Other Topics Concern    None   Social History Narrative    None     Social Determinants of Health     Financial Resource Strain:     Difficulty of Paying Living Expenses: Not on file   Food Insecurity:     Worried About Running Out of Food in the Last Year: Not on file    Petar of Food in the Last Year: Not on file   Transportation Needs:     Lack of Transportation (Medical): Not on file    Lack of Transportation (Non-Medical):  Not on file   Physical Activity:     Days of Exercise per Week: Not on file    Minutes of Exercise per Session: Not on file   Stress:     Feeling of Stress : Not on file   Social Connections:     Frequency of Communication with Friends and Family: Not on file    Frequency of Social Gatherings with Friends and Family: Not on file    Attends Mandaeism Services: Not on file    Active Member of Clubs or Organizations: Not on file    Attends Club or Organization Meetings: Not on file    Marital Status: Not on file   Intimate Partner Violence:     Fear of Current or Ex-Partner: Not on file   Alvaro Ragland Emotionally Abused: Not on file    Physically Abused: Not on file    Sexually Abused: Not on file   Housing Stability:     Unable to Pay for Housing in the Last Year: Not on file    Number of Places Lived in the Last Year: Not on file    Unstable Housing in the Last Year: Not on file       SCREENINGS    Teresa Coma Scale  Eye Opening: Spontaneous  Best Verbal Response: Oriented  Best Motor Response: Obeys commands  [place] Coma Scale Score: 15          PHYSICAL EXAM    (up to 7 for level 4, 8 or more for level 5)     ED Triage Vitals [05/15/22 0915]   BP Temp Temp Source Pulse Resp SpO2 Height Weight   128/80 98 °F (36.7 °C) Oral 85 14 100 % 5' 8\" (1.727 m) 232 lb (105.2 kg)       Physical Exam    General appearance:  Cooperative. No acute distress. Skin:  Warm. Dry. Eye:  Extraocular movements intact. Ears, nose, mouth and throat:  Oral mucosa moist, edema and erythema to the right external auditory canal with discharge and drainage. TM over 90% obscured but no obvious perforation. No tenderness of the right mastoid. Oropharynx pink and moist with no exudates. Tongue and uvula midline. Floor of mouth soft. Mild edema and erythema to the gums and soft tissue adjacent to tooth #1 without obvious mass, fluctuance or drainage  Neck:  Trachea midline. Heart:  Regular rate and rhythm  Perfusion:  intact  Respiratory:  Lungs clear to auscultation bilaterally. Respirations nonlabored. Abdominal:   Non distended. Nontender  Neurological:  Alert and oriented x 3. Moves all extremities spontaneously  Musculoskeletal:   Normal ROM, no deformities          Psychiatric:  Normal mood      DIAGNOSTIC RESULTS       Labs Reviewed - No data to display    Interpretation per the Radiologist below, if obtained/available at the time of this note:    No orders to display       All other labs/imaging were within normal range or not returned as of this dictation.     EMERGENCY DEPARTMENT COURSE and DIFFERENTIAL DIAGNOSIS/MDM:   Vitals:    Vitals:    05/15/22 0915   BP: 128/80   Pulse: 85   Resp: 14   Temp: 98 °F (36.7 °C)   TempSrc: Oral   SpO2: 100%   Weight: 232 lb (105.2 kg)   Height: 5' 8\" (1.727 m)       Patient presents today for dental pain and right ear pain. Appears to have a dental infection to her wisdom tooth. Additionally, likely has an otitis externa. No exam or history or physical findings concerning for mastoiditis. Patient is a diabetic but notes her sugars have been running controlled. Will be given a prescription for penicillin and Cortisporin but otherwise feels she may be discharged home. No drainable abscess. No obvious eardrum perforation though admittedly TM was somewhat obscured with discharge and drainage    MDM    CONSULTS     None    Critical Care:   None    REASSESSMENT          PROCEDURE     Unless otherwise noted below, none     Procedures      FINAL IMPRESSION      1. Pain, dental    2. Infective otitis externa of right ear            DISPOSITION/PLAN   DISPOSITION Decision To Discharge 05/15/2022 09:21:51 AM        PATIENT REFERRED TO:  [facility] [address] Emergency Department  [address] [place] [id number]  [phone]    As needed    Dentistry      Please follow-up with a dentist of your choosing. DISCHARGE MEDICATIONS:  Discharge Medication List as of 5/15/2022  9:34 AM      START taking these medications    Details   penicillin v potassium (VEETID) 500 MG tablet Take 1 tablet by mouth 4 times daily for 10 days, Disp-40 tablet, R-0Print      neomycin-polymyxin-hydrocortisone (CORTISPORIN) 3.5-93331-8 otic solution Place 4 drops into the right ear 3 times daily for 10 days Instill into right Ear, Disp-1 each, R-0Print           Controlled Substances Monitoring:     No flowsheet data found.     (Please note that portions of this note were completed with a voice recognition program.  Efforts were made to edit the dictations but occasionally words are mis-transcribed.)    Jaden Toribio MD (electronically signed)  Attending Emergency Physician            Luz Marina Gibbons MD  05/15/22 [id number]

## 2022-11-12 ENCOUNTER — HOSPITAL ENCOUNTER (EMERGENCY)
Age: 47
Discharge: HOME | End: 2022-11-12
Attending: EMERGENCY MEDICINE
Payer: COMMERCIAL

## 2022-11-12 VITALS
OXYGEN SATURATION: 100 % | BODY MASS INDEX: 33.96 KG/M2 | HEART RATE: 83 BPM | SYSTOLIC BLOOD PRESSURE: 122 MMHG | TEMPERATURE: 98.4 F | RESPIRATION RATE: 20 BRPM | HEIGHT: 68 IN | WEIGHT: 224.1 LBS | DIASTOLIC BLOOD PRESSURE: 80 MMHG

## 2022-11-12 DIAGNOSIS — E11.9 NEW ONSET TYPE 2 DIABETES MELLITUS (HCC): ICD-10-CM

## 2022-11-12 DIAGNOSIS — Z79.4 TYPE 2 DIABETES MELLITUS WITH HYPERGLYCEMIA, WITH LONG-TERM CURRENT USE OF INSULIN (HCC): ICD-10-CM

## 2022-11-12 DIAGNOSIS — R73.9 HYPERGLYCEMIA: Primary | ICD-10-CM

## 2022-11-12 DIAGNOSIS — E11.65 TYPE 2 DIABETES MELLITUS WITH HYPERGLYCEMIA, WITH LONG-TERM CURRENT USE OF INSULIN (HCC): ICD-10-CM

## 2022-11-12 LAB
A/G RATIO: 1.2 (ref 1.1–2.2)
ALBUMIN SERPL-MCNC: 4.5 G/DL (ref 3.4–5)
ALP BLD-CCNC: 212 U/L (ref 40–129)
ALT SERPL-CCNC: 71 U/L (ref 10–40)
ANION GAP SERPL CALCULATED.3IONS-SCNC: 11 MMOL/L (ref 3–16)
AST SERPL-CCNC: 52 U/L (ref 15–37)
BASE EXCESS VENOUS: 1 MMOL/L (ref -2–3)
BASOPHILS ABSOLUTE: 0 K/UL (ref 0–0.2)
BASOPHILS RELATIVE PERCENT: 0.3 %
BETA-HYDROXYBUTYRATE: 0.26 MMOL/L (ref 0–0.27)
BILIRUB SERPL-MCNC: 0.3 MG/DL (ref 0–1)
BILIRUBIN URINE: NEGATIVE
BLOOD, URINE: NEGATIVE
BUN BLDV-MCNC: 14 MG/DL (ref 7–20)
CALCIUM SERPL-MCNC: 10.1 MG/DL (ref 8.3–10.6)
CARBOXYHEMOGLOBIN: 1.1 % (ref 0–1.5)
CHLORIDE BLD-SCNC: 93 MMOL/L (ref 99–110)
CLARITY: CLEAR
CO2: 26 MMOL/L (ref 21–32)
COLOR: YELLOW
CREAT SERPL-MCNC: 0.7 MG/DL (ref 0.6–1.1)
EOSINOPHILS ABSOLUTE: 0.1 K/UL (ref 0–0.6)
EOSINOPHILS RELATIVE PERCENT: 0.8 %
GFR SERPL CREATININE-BSD FRML MDRD: >60 ML/MIN/{1.73_M2}
GLUCOSE BLD-MCNC: 313 MG/DL (ref 70–99)
GLUCOSE BLD-MCNC: 444 MG/DL (ref 70–99)
GLUCOSE BLD-MCNC: 451 MG/DL (ref 70–99)
GLUCOSE URINE: >=1000 MG/DL
HCG(URINE) PREGNANCY TEST: NEGATIVE
HCO3 VENOUS: 28.3 MMOL/L (ref 24–28)
HCT VFR BLD CALC: 43.6 % (ref 36–48)
HEMOGLOBIN, VEN, REDUCED: 62.7 %
HEMOGLOBIN: 15.1 G/DL (ref 12–16)
INFLUENZA A: NOT DETECTED
INFLUENZA B: NOT DETECTED
KETONES, URINE: NEGATIVE MG/DL
LEUKOCYTE ESTERASE, URINE: NEGATIVE
LYMPHOCYTES ABSOLUTE: 2.4 K/UL (ref 1–5.1)
LYMPHOCYTES RELATIVE PERCENT: 22.3 %
MCH RBC QN AUTO: 26.9 PG (ref 26–34)
MCHC RBC AUTO-ENTMCNC: 34.5 G/DL (ref 31–36)
MCV RBC AUTO: 77.9 FL (ref 80–100)
METHEMOGLOBIN VENOUS: 0.4 % (ref 0–1.5)
MICROSCOPIC EXAMINATION: ABNORMAL
MONOCYTES ABSOLUTE: 0.6 K/UL (ref 0–1.3)
MONOCYTES RELATIVE PERCENT: 5.5 %
NEUTROPHILS ABSOLUTE: 7.7 K/UL (ref 1.7–7.7)
NEUTROPHILS RELATIVE PERCENT: 71.1 %
NITRITE, URINE: NEGATIVE
O2 SAT, VEN: 36 %
PCO2, VEN: 54.3 MMHG (ref 41–51)
PDW BLD-RTO: 15.1 % (ref 12.4–15.4)
PERFORMED ON: ABNORMAL
PERFORMED ON: ABNORMAL
PH UA: 6 (ref 5–8)
PH VENOUS: 7.33 (ref 7.35–7.45)
PLATELET # BLD: 260 K/UL (ref 135–450)
PMV BLD AUTO: 9.6 FL (ref 5–10.5)
PO2, VEN: <30 MMHG (ref 25–40)
POTASSIUM REFLEX MAGNESIUM: 4.6 MMOL/L (ref 3.5–5.1)
PROTEIN UA: NEGATIVE MG/DL
RBC # BLD: 5.6 M/UL (ref 4–5.2)
SARS-COV-2 RNA, RT PCR: NOT DETECTED
SODIUM BLD-SCNC: 130 MMOL/L (ref 136–145)
SPECIFIC GRAVITY UA: 1.01 (ref 1–1.03)
TCO2 CALC VENOUS: 30 MMOL/L
TOTAL PROTEIN: 8.2 G/DL (ref 6.4–8.2)
URINE REFLEX TO CULTURE: ABNORMAL
URINE TYPE: ABNORMAL
UROBILINOGEN, URINE: 0.2 E.U./DL
WBC # BLD: 10.9 K/UL (ref 4–11)

## 2022-11-12 PROCEDURE — 96374 THER/PROPH/DIAG INJ IV PUSH: CPT

## 2022-11-12 PROCEDURE — 84703 CHORIONIC GONADOTROPIN ASSAY: CPT

## 2022-11-12 PROCEDURE — 87636 SARSCOV2 & INF A&B AMP PRB: CPT

## 2022-11-12 PROCEDURE — 80053 COMPREHEN METABOLIC PANEL: CPT

## 2022-11-12 PROCEDURE — 85025 COMPLETE CBC W/AUTO DIFF WBC: CPT

## 2022-11-12 PROCEDURE — 81003 URINALYSIS AUTO W/O SCOPE: CPT

## 2022-11-12 PROCEDURE — 2580000003 HC RX 258: Performed by: EMERGENCY MEDICINE

## 2022-11-12 PROCEDURE — 99284 EMERGENCY DEPT VISIT MOD MDM: CPT

## 2022-11-12 PROCEDURE — 82803 BLOOD GASES ANY COMBINATION: CPT

## 2022-11-12 PROCEDURE — 82010 KETONE BODYS QUAN: CPT

## 2022-11-12 PROCEDURE — 6370000000 HC RX 637 (ALT 250 FOR IP): Performed by: EMERGENCY MEDICINE

## 2022-11-12 RX ORDER — NYSTATIN AND TRIAMCINOLONE ACETONIDE 100000; 1 [USP'U]/G; MG/G
OINTMENT TOPICAL
Qty: 30 G | Refills: 0 | Status: SHIPPED | OUTPATIENT
Start: 2022-11-12

## 2022-11-12 RX ORDER — SODIUM CHLORIDE, SODIUM LACTATE, POTASSIUM CHLORIDE, AND CALCIUM CHLORIDE .6; .31; .03; .02 G/100ML; G/100ML; G/100ML; G/100ML
1000 INJECTION, SOLUTION INTRAVENOUS ONCE
Status: COMPLETED | OUTPATIENT
Start: 2022-11-12 | End: 2022-11-12

## 2022-11-12 RX ADMIN — SODIUM CHLORIDE, POTASSIUM CHLORIDE, SODIUM LACTATE AND CALCIUM CHLORIDE 1000 ML: 600; 310; 30; 20 INJECTION, SOLUTION INTRAVENOUS at 08:52

## 2022-11-12 RX ADMIN — SODIUM CHLORIDE, POTASSIUM CHLORIDE, SODIUM LACTATE AND CALCIUM CHLORIDE 1000 ML: 600; 310; 30; 20 INJECTION, SOLUTION INTRAVENOUS at 12:36

## 2022-11-12 RX ADMIN — INSULIN HUMAN 5 UNITS: 100 INJECTION, SOLUTION PARENTERAL at 12:36

## 2022-11-12 ASSESSMENT — PAIN - FUNCTIONAL ASSESSMENT: PAIN_FUNCTIONAL_ASSESSMENT: NONE - DENIES PAIN

## 2022-11-12 NOTE — ED PROVIDER NOTES
[id number]          ATTENDING PHYSICIAN NOTE       Date of evaluation: 11/12/2022    Chief Complaint     Hyperglycemia (Patient has been out of insulin for 2 months, too expensive to refill, in the last week has started with dizziness, weakness and leg pain, BS 500s this AM)      History of Present Illness     Makenna Boyce is a 42 y.o. female who presents to the emergency room today complaining of fatigue body aches and elevated blood sugars. Patient reports that she has had some difficulties adhering to her insulin regimen because of cost issues over the last few months. Reports that for the last week she has felt lightheaded fatigued and had aches in her muscles. Reports that her blood sugars have been high this week and were over 500 on her home meter today. Denies fevers or chills reports no upper respiratory symptoms. Reports no vomiting or diarrhea. She reports increased thirst and polyuria. Review of Systems     Review of Systems  All systems were reviewed with the patient/family and negative except as otherwise documented in the HPI. Past Medical, Surgical, Family, and Social History     She has a past medical history of Diabetes mellitus ([address] and Hypertension. She has a past surgical history that includes Appendectomy. Her family history includes Asthma in her mother; Diabetes in her mother; Heart Disease in her mother. She reports that she has never smoked. She has never used smokeless tobacco. She reports that she does not drink alcohol and does not use drugs. Medications     Previous Medications    ALCOHOL SWABS (ALCOHOL PADS) 70 % PADS    1 box by Does not apply route 4 times daily    AMLODIPINE (NORVASC) 5 MG TABLET    Take 1 tablet by mouth daily    BLOOD GLUCOSE MONITOR STRIPS    Test 3 times a day & as needed for symptoms of irregular blood glucose.  Dispense sufficient amount for indicated testing frequency plus additional to accommodate PRN testing needs. BLOOD GLUCOSE MONITOR STRIPS    Test 2 times a day & as needed for symptoms of irregular blood glucose. Dispense sufficient amount for indicated testing frequency plus additional to accommodate PRN testing needs. BLOOD GLUCOSE MONITOR STRIPS    Test 2 times a day & as needed for symptoms of irregular blood glucose. Dispense sufficient amount for indicated testing frequency plus additional to accommodate PRN testing needs. BLOOD GLUCOSE MONITORING SUPPL (ONE TOUCH ULTRA 2) W/DEVICE KIT    1 kit by Does not apply route daily    BLOOD GLUCOSE TEST STRIPS (ONETOUCH VERIO) STRIP    1 each by In Vitro route 3 times daily As needed. DAPAGLIFLOZIN (FARXIGA) 10 MG TABLET    Take 1 tablet by mouth every morning    GLUCOSE MONITORING (FREESTYLE) KIT    Test blood sugars 4 times daily    IBUPROFEN (ADVIL;MOTRIN) 800 MG TABLET    Take 1 tablet by mouth every 8 hours as needed for Pain or Fever    INSULIN DEGLUDEC 200 UNIT/ML SOPN    Inject 10 Units into the skin nightly    INSULIN PEN NEEDLE ([employer] PEN NEEDLES 31G) 31G X 8 MM MISC    1 each by Does not apply route daily    INSULIN PEN NEEDLE ([employer] PEN NEEDLES 31G) 31G X 8 MM MISC    1 each by Does not apply route daily    [employer] LANCETS MISC    1 each by Does not apply route daily    LANCETS MISC    1 each by Does not apply route 2 times daily    METFORMIN (GLUCOPHAGE) 1000 MG TABLET    Take 1 tablet by mouth 2 times daily (with meals)    NYSTATIN-TRIAMCINOLONE (MYCOLOG) 830319-0.1 UNIT/GM-% OINTMENT    Apply topically 2 times daily for 7-10 days       Allergies     She is allergic to risperidone. Physical Exam     INITIAL VITALS: BP: (!) 127/96, Temp: 98.4 °F (36.9 °C), Heart Rate: (!) 102, Resp: 16, SpO2: 99 %   Physical Exam  Constitutional: Well-nourished appearing middle-aged female sitting up in the hospital stretcher comfortable and in no immediate distress  Eyes:  Sclera anicteric.   HEENT:  Normocephalic, oropharynx moist. Neck: normal range of motion, supple. Respiratory:  Even and non-labored, no distress   Cardiovascular:  Extremities warm, well perfused  GI:  Soft, nondistended, nontender to palpation  Musculoskeletal:  No edema, no deformities. Skin:  No rash, no lesions, no pallor   Neurologic:  Awake and alert, oriented x4. Symmetrical smile and facial muscle movements. Equal  strength bilaterally speech is clear and coherent ambulatory with a normal narrow based gait. Moving all extremities purposefully and with grossly normal coordination.     :  Normal appearing female external genitalia no apparent drainage or bleeding, internal exam was deferred, erythematous raised rash of the intertriginous areas of the groin      Diagnostic Results     EKG   Normal sinus rhythm, nonspecific T wave flattening and some T wave inversions which appear unchanged compared to baseline EKG from March of last year    RADIOLOGY:  No orders to display       LABS:   Results for orders placed or performed during the hospital encounter of 11/12/22   CBC with Auto Differential   Result Value Ref Range    WBC 10.9 4.0 - 11.0 K/uL    RBC 5.60 (H) 4.00 - 5.20 M/uL    Hemoglobin 15.1 12.0 - 16.0 g/dL    Hematocrit 43.6 36.0 - 48.0 %    MCV 77.9 (L) 80.0 - 100.0 fL    MCH 26.9 26.0 - 34.0 pg    MCHC 34.5 31.0 - 36.0 g/dL    RDW 15.1 12.4 - 15.4 %    Platelets 899 922 - 760 K/uL    MPV 9.6 5.0 - 10.5 fL    Neutrophils % 71.1 %    Lymphocytes % 22.3 %    Monocytes % 5.5 %    Eosinophils % 0.8 %    Basophils % 0.3 %    Neutrophils Absolute 7.7 1.7 - 7.7 K/uL    Lymphocytes Absolute 2.4 1.0 - 5.1 K/uL    Monocytes Absolute 0.6 0.0 - 1.3 K/uL    Eosinophils Absolute 0.1 0.0 - 0.6 K/uL    Basophils Absolute 0.0 0.0 - 0.2 K/uL   CMP w/ Reflex to MG   Result Value Ref Range    Sodium 130 (L) 136 - 145 mmol/L    Potassium reflex Magnesium 4.6 3.5 - 5.1 mmol/L    Chloride 93 (L) 99 - 110 mmol/L    CO2 26 21 - 32 mmol/L    Anion Gap 11 3 - 16    Glucose 451 (H) 70 - 99 mg/dL    BUN 14 7 - 20 mg/dL    Creatinine 0.7 0.6 - 1.1 mg/dL    Est, Glom Filt Rate >60 >60    Calcium 10.1 8.3 - 10.6 mg/dL    Total Protein 8.2 6.4 - 8.2 g/dL    Albumin 4.5 3.4 - 5.0 g/dL    Albumin/Globulin Ratio 1.2 1.1 - 2.2    Total Bilirubin 0.3 0.0 - 1.0 mg/dL    Alkaline Phosphatase 212 (H) 40 - 129 U/L    ALT 71 (H) 10 - 40 U/L    AST 52 (H) 15 - 37 U/L   Blood gas, venous (Lab)   Result Value Ref Range    pH, Arthur 7.325 (L) 7.350 - 7.450    pCO2, Arthur 54.3 (H) 41.0 - 51.0 mmHg    pO2, Arthur <30.0 25.0 - 40.0 mmHg    HCO3, Venous 28.3 (H) 24.0 - 28.0 mmol/L    Base Excess, Arthur 1.0 -2.0 - 3.0 mmol/L    O2 Sat, Arthur 36 Not established %    Carboxyhemoglobin 1.1 0.0 - 1.5 %    MetHgb, Arthur 0.4 0.0 - 1.5 %    TC02 (Calc), Arthur 30 mmol/L    Hemoglobin, Arthur, Reduced 62.70 %   Urinalysis with Reflex to Culture    Specimen: Urine   Result Value Ref Range    Color, UA Yellow Straw/Yellow    Clarity, UA Clear Clear    Glucose, Ur >=1000 (A) Negative mg/dL    Bilirubin Urine Negative Negative    Ketones, Urine Negative Negative mg/dL    Specific Gravity, UA 1.010 1.005 - 1.030    Blood, Urine Negative Negative    pH, UA 6.0 5.0 - 8.0    Protein, UA Negative Negative mg/dL    Urobilinogen, Urine 0.2 <2.0 E.U./dL    Nitrite, Urine Negative Negative    Leukocyte Esterase, Urine Negative Negative    Microscopic Examination Not Indicated     Urine Type Other     Urine Reflex to Culture Not Indicated    Urine Preg (Lab)   Result Value Ref Range    HCG(Urine) Pregnancy Test Negative Detects HCG level >20 MIU/mL   POCT Glucose   Result Value Ref Range    POC Glucose 444 (H) 70 - 99 mg/dl    Performed on ACCU-CHEK          RECENT VITALS:  BP: (!) 127/96,Temp: 98.4 °F (36.9 °C), Heart Rate: (!) 102, Resp: 16, SpO2: 99 %     Procedures         ED Course     Nursing Notes, Past Medical Hx, Past Surgical Hx, Social Hx,Allergies, and Family Hx were reviewed.     patient was given the following medications:  Orders Placed This Encounter   Medications    lactated ringers bolus       CONSULTS:  None    MEDICAL DECISIONMAKING / ASSESSMENT / Natividad Charels is a 42 y.o. female with a past medical history of type 2 diabetes who presents to the emergency room today complaining of elevated blood sugars, lethargy and feeling unwell as well as a rash in her groin. On my initial evaluation the patient is well-appearing her vital signs at presentation are unremarkable. Overall symptoms seem attributable likely to elevated blood sugars and dehydration, suspect medication noncompliance is the primary etiology of this though she does have some other symptoms perhaps suggestive of a viral illness. Patient will be swabbed for COVID and flu screening labs including urinalysis venous blood gas and ketones were evaluated. Patient blood sugar is noted to be quite elevated she is not acidotic does not have an anion gap to raise concern for DKA, blood ketone testing is pending at time of discharge. Patient was given bolus IV fluids with improvement in her tachycardia and resolution of most of her symptoms. She was given a dose of insulin here as well. She has no significant leukocytosis or other exam findings to raise concern for acute bacterial illness. Examination of rash in her groin reveals signs of fungal infection likely secondary to poorly controlled blood sugars, she was given a cream for this. All of the patient's diabetes medications were represcribed and the patient was given information about medication assistance and prescription straightens programs. At this time she is feeling much better and is stable for discharge home. Patient and family informed about their diagnosis and plan. They were counseled about home care instructions, return precautions, need for follow-up and all medication instructions.   They were given an opportunity to ask questions and all of the questions were answered prior to discharge. Clinical Impression     1. Hyperglycemia    2. New onset type 2 diabetes mellitus ([facility]    3. Type 2 diabetes mellitus with hyperglycemia, with long-term current use of insulin (HCC)        Disposition     PATIENT REFERRED TO:  No follow-up provider specified.     DISCHARGE MEDICATIONS:  New Prescriptions    No medications on file       DISPOSITION           Lauren Ceja MD  11/16/22 [id number]

## 2022-11-12 NOTE — Clinical Note
Angela Yanez was seen and treated in our emergency department on 11/12/2022. She may return to work on 11/15/2022. If you have any questions or concerns, please don't hesitate to call.       Elaine Swain MD

## 2022-11-12 NOTE — DISCHARGE INSTRUCTIONS
Please take all medications as directed, please check your sugar frequently at least a few times a day and return to the emergency department for persistently elevated values above 300. Please follow-up with your primary care physician for further refills and ongoing management of your blood sugar issues.

## 2023-01-17 ENCOUNTER — HOSPITAL ENCOUNTER (EMERGENCY)
Age: 48
Discharge: HOME | End: 2023-01-17
Attending: EMERGENCY MEDICINE
Payer: COMMERCIAL

## 2023-01-17 VITALS
HEIGHT: 69 IN | SYSTOLIC BLOOD PRESSURE: 133 MMHG | DIASTOLIC BLOOD PRESSURE: 86 MMHG | WEIGHT: 217.9 LBS | BODY MASS INDEX: 32.27 KG/M2 | HEART RATE: 115 BPM | TEMPERATURE: 98.6 F | RESPIRATION RATE: 10 BRPM | OXYGEN SATURATION: 99 %

## 2023-01-17 DIAGNOSIS — K08.89 PAIN, DENTAL: Primary | ICD-10-CM

## 2023-01-17 PROCEDURE — 6370000000 HC RX 637 (ALT 250 FOR IP): Performed by: EMERGENCY MEDICINE

## 2023-01-17 PROCEDURE — 99283 EMERGENCY DEPT VISIT LOW MDM: CPT

## 2023-01-17 RX ORDER — CLINDAMYCIN HYDROCHLORIDE 300 MG/1
300 CAPSULE ORAL ONCE
Status: COMPLETED | OUTPATIENT
Start: 2023-01-17 | End: 2023-01-17

## 2023-01-17 RX ORDER — NAPROXEN 500 MG/1
500 TABLET ORAL 2 TIMES DAILY
Qty: 20 TABLET | Refills: 0 | Status: SHIPPED | OUTPATIENT
Start: 2023-01-17 | End: 2023-01-27

## 2023-01-17 RX ORDER — CLINDAMYCIN HYDROCHLORIDE 300 MG/1
300 CAPSULE ORAL 4 TIMES DAILY
Qty: 40 CAPSULE | Refills: 0 | Status: SHIPPED | OUTPATIENT
Start: 2023-01-17 | End: 2023-01-27

## 2023-01-17 RX ORDER — NAPROXEN 500 MG/1
500 TABLET ORAL ONCE
Status: COMPLETED | OUTPATIENT
Start: 2023-01-17 | End: 2023-01-17

## 2023-01-17 RX ORDER — OXYCODONE HYDROCHLORIDE AND ACETAMINOPHEN 5; 325 MG/1; MG/1
1-2 TABLET ORAL EVERY 6 HOURS PRN
Qty: 8 TABLET | Refills: 0 | Status: SHIPPED | OUTPATIENT
Start: 2023-01-17 | End: 2023-01-24

## 2023-01-17 RX ADMIN — NAPROXEN 500 MG: 500 TABLET ORAL at 20:11

## 2023-01-17 RX ADMIN — CLINDAMYCIN HYDROCHLORIDE 300 MG: 300 CAPSULE ORAL at 20:11

## 2023-01-18 NOTE — ED PROVIDER NOTES
[address]   eMERGENCY dEPARTMENT eNCOUnter      Pt Name: Xiomara Pleitez  MRN: [id number]  [place] 1975  Date of evaluation: 1/17/2023  Provider: Pedro Pablo Birch MD  PCP: No primary care provider on file. CHIEF COMPLAINT       Chief Complaint   Patient presents with    Dental Pain     RIGHT SIDE        HISTORY OFPRESENT ILLNESS   (Location/Symptom, Timing/Onset, Context/Setting, Quality, Duration, Modifying Factors,Severity)  Note limiting factors. Xiomara Pleitez is a 41 y.o. female presents with complaints of right sided tooth pain that she has had for several days denies any fever denies any neck pain denies any ear pain it hurts to chew she denies any chest pain or shortness of    Nursing Notes were all reviewed and agreed with or any disagreements were addressed  in the HPI. REVIEW OF SYSTEMS    (2-9 systems for level 4, 10 or more for level 5)     Review of Systems    Positives and Pertinent negatives as per HPI. Except as noted above in the ROS, all other systems were reviewed andnegative. PASTMEDICAL HISTORY     Past Medical History:   Diagnosis Date    Diabetes mellitus ([address]     Hypertension          SURGICAL HISTORY       Past Surgical History:   Procedure Laterality Date    APPENDECTOMY           CURRENT MEDICATIONS       Previous Medications    ALCOHOL SWABS (ALCOHOL PADS) 70 % PADS    1 box by Does not apply route 4 times daily    AMLODIPINE (NORVASC) 5 MG TABLET    Take 1 tablet by mouth daily    BLOOD GLUCOSE MONITOR STRIPS    Test 3 times a day & as needed for symptoms of irregular blood glucose. Dispense sufficient amount for indicated testing frequency plus additional to accommodate PRN testing needs. BLOOD GLUCOSE MONITOR STRIPS    Test 2 times a day & as needed for symptoms of irregular blood glucose. Dispense sufficient amount for indicated testing frequency plus additional to accommodate PRN testing needs.     BLOOD GLUCOSE MONITOR STRIPS    Test 2 times a day & as needed for symptoms of irregular blood glucose. Dispense sufficient amount for indicated testing frequency plus additional to accommodate PRN testing needs. BLOOD GLUCOSE MONITORING SUPPL (ONE TOUCH ULTRA 2) W/DEVICE KIT    1 kit by Does not apply route daily    BLOOD GLUCOSE TEST STRIPS (ONETOUCH VERIO) STRIP    1 each by In Vitro route 3 times daily As needed.     DAPAGLIFLOZIN (FARXIGA) 10 MG TABLET    Take 1 tablet by mouth every morning    GLUCOSE MONITORING (FREESTYLE) KIT    Test blood sugars 4 times daily    IBUPROFEN (ADVIL;MOTRIN) 800 MG TABLET    Take 1 tablet by mouth every 8 hours as needed for Pain or Fever    INSULIN PEN NEEDLE ([employer] PEN NEEDLES 31G) 31G X 8 MM MISC    1 each by Does not apply route daily    INSULIN PEN NEEDLE ([employer] PEN NEEDLES 31G) 31G X 8 MM MISC    1 each by Does not apply route daily    [employer] LANCETS MISC    1 each by Does not apply route daily    LANCETS MISC    1 each by Does not apply route 2 times daily    METFORMIN (GLUCOPHAGE) 1000 MG TABLET    Take 1 tablet by mouth 2 times daily (with meals)    NYSTATIN-TRIAMCINOLONE (MYCOLOG) 863580-3.1 UNIT/GM-% OINTMENT    Apply topically 2 times daily for 7-10 days       ALLERGIES     Risperidone    FAMILY HISTORY       Family History   Problem Relation Age of Onset    Asthma Mother     Diabetes Mother     Heart Disease Mother           SOCIAL HISTORY       Social History     Socioeconomic History    Marital status: Single     Spouse name: None    Number of children: None    Years of education: None    Highest education level: None   Tobacco Use    Smoking status: Never    Smokeless tobacco: Never   Vaping Use    Vaping Use: Never used   Substance and Sexual Activity    Alcohol use: No    Drug use: No    Sexual activity: Yes     Partners: Male       SCREENINGS    [place] Coma Scale  Eye Opening: Spontaneous  Best Verbal Response: Oriented  Best Motor Response: Obeys commands  [place] Coma Scale Score: 15 @FLOW(92842722)@      PHYSICAL EXAM    (up to 7 for level 4, 8 or more for level 5)     ED Triage Vitals [01/17/23 1954]   BP Temp Temp Source Heart Rate Resp SpO2 Height Weight   133/86 98.6 °F (37 °C) Oral (!) 115 10 99 % 5' 9\" (1.753 m) 217 lb 14.4 oz (98.8 kg)       Physical Exam      General Appearance:  Alert, cooperative, no distress, appears stated age. Head:  Normocephalic, without obviousabnormality, atraumatic. Eyes:  conjunctiva/corneas clear, EOM's intact. Sclera anicteric. ENT: Mucous membranes moist.  No signs of any periostitis no swelling no obvious dental carry   Neck: Supple, symmetrical, trachea midline, no adenopathy. No jugular venous distention. Extremities: No edema, cords or calf tenderness. Full range of motion. Pulses: 2+ and symmetric   Skin: Turgor is normal, no rashes or lesions. Neurologic: Alert and oriented X 3. No focal findings. Motor grossly normal.  Speech clear, no drift, CN III-XII grossly intact,        DIAGNOSTIC RESULTS   LABS:    Labs Reviewed - No data to display    All other labs were within normal range or not returned as of this dictation. EKG: All EKG's are interpreted by the Emergency Department Physician who eithersigns or Co-signs this chart in the absence of a cardiologist.        RADIOLOGY:   Non-plain film images such as CT, Ultrasound and MRI are read by the radiologist. Plain radiographic images are visualized by myself.       *    Interpretation per the Radiologist below, if available at the time of this note:    No orders to display         PROCEDURES   Unless otherwise noted below, none     Procedures    *    CRITICAL CARE TIME   N/A      EMERGENCY DEPARTMENT COURSE and DIFFERENTIALDIAGNOSIS/MDM:   Vitals:    Vitals:    01/17/23 1954   BP: 133/86   Pulse: (!) 115   Resp: 10   Temp: 98.6 °F (37 °C)   TempSrc: Oral   SpO2: 99%   Weight: 217 lb 14.4 oz (98.8 kg)   Height: 5' 9\" (1.753 m)       Patient was given thefollowing medications:  Medications   naproxen (NAPROSYN) tablet 500 mg (has no administration in time range)   clindamycin (CLEOCIN) capsule 300 mg (has no administration in time range)           The patient tolerated their visit well. The patient and / or the familywere informed of the results of any tests, a time was given to answer questions. FINAL IMPRESSION      1. Pain, dental          DISPOSITION/PLAN   DISPOSITION Discharge - Pending Orders Complete 01/17/2023 08:06:10 PM  Mark Meza will be for antibiotics pain medication and referral to outpatient dental clinic    PATIENT REFERRED TO:  Dental clinic    In 2 days        DISCHARGE MEDICATIONS:  New Prescriptions    CLINDAMYCIN (CLEOCIN) 300 MG CAPSULE    Take 1 capsule by mouth 4 times daily for 10 days    NAPROXEN (NAPROSYN) 500 MG TABLET    Take 1 tablet by mouth 2 times daily for 20 doses    OXYCODONE-ACETAMINOPHEN (PERCOCET) 5-325 MG PER TABLET    Take 1-2 tablets by mouth every 6 hours as needed for Pain for up to 7 days.  Max Daily Amount: 8 tablets       DISCONTINUED MEDICATIONS:  Discontinued Medications    No medications on file              (Please note that portions of this note were completed with a voice recognition program.  Efforts were made to edit the dictations but occasionally words are mis-transcribed.)    Milagros Chavez MD (electronically signed)       Milagros Chavez MD  01/17/23 2010

## 2023-01-18 NOTE — ED TRIAGE NOTES
48Y/O female presents to the ED with left side tooth pain x1 week. Pt states she has a chipped tooth and was eating ice with sudden onset of severe pain worsening over the past week. -n/f/v/c/d.

## 2023-01-18 NOTE — DISCHARGE INSTRUCTIONS
Discharge home  Follow-up with dental clinic  Naprosyn for pain  Percocet for breakthrough pain  Clindamycin

## 2023-04-11 ENCOUNTER — HOSPITAL ENCOUNTER (EMERGENCY)
Age: 48
Discharge: HOME | End: 2023-04-11
Attending: EMERGENCY MEDICINE
Payer: COMMERCIAL

## 2023-04-11 ENCOUNTER — APPOINTMENT (OUTPATIENT)
Dept: CT IMAGING | Age: 48
End: 2023-04-11
Payer: COMMERCIAL

## 2023-04-11 VITALS
TEMPERATURE: 97.9 F | HEART RATE: 82 BPM | BODY MASS INDEX: 32.72 KG/M2 | SYSTOLIC BLOOD PRESSURE: 121 MMHG | DIASTOLIC BLOOD PRESSURE: 80 MMHG | RESPIRATION RATE: 18 BRPM | OXYGEN SATURATION: 98 % | WEIGHT: 221.6 LBS

## 2023-04-11 DIAGNOSIS — R51.9 FACIAL PAIN: ICD-10-CM
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
## 2023-04-11 PROCEDURE — 70486 CT MAXILLOFACIAL W/O DYE: CPT

## 2023-04-11 PROCEDURE — 99284 EMERGENCY DEPT VISIT MOD MDM: CPT

## 2023-04-11 PROCEDURE — 6370000000 HC RX 637 (ALT 250 FOR IP)

## 2023-04-11 RX ORDER — IBUPROFEN 600 MG/1
600 TABLET ORAL 3 TIMES DAILY PRN
Qty: 30 TABLET | Refills: 0 | Status: SHIPPED | OUTPATIENT
Start: 2023-04-11

## 2023-04-11 RX ADMIN — IBUPROFEN 600 MG: 400 TABLET ORAL at 11:32

## 2023-04-11 ASSESSMENT — PAIN SCALES - GENERAL: PAINLEVEL_OUTOF10: 7

## 2023-04-11 ASSESSMENT — PAIN DESCRIPTION - PAIN TYPE: TYPE: ACUTE PAIN

## 2023-04-11 ASSESSMENT — PAIN DESCRIPTION - FREQUENCY: FREQUENCY: CONTINUOUS

## 2023-04-11 ASSESSMENT — PAIN DESCRIPTION - ORIENTATION: ORIENTATION: RIGHT

## 2023-04-11 ASSESSMENT — PAIN - FUNCTIONAL ASSESSMENT: PAIN_FUNCTIONAL_ASSESSMENT: 0-10

## 2023-04-11 ASSESSMENT — PAIN DESCRIPTION - LOCATION: LOCATION: FACE
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
## 2023-04-11 NOTE — ED PROVIDER NOTES
ED Attending Attestation Note     Date of evaluation: 4/11/2023    This patient was seen by the resident. I have seen and examined the patient, agree with the workup, evaluation, management and diagnosis. The care plan has been discussed. My assessment reveals a 46-year-old female presenting to the emergency department with a complaint of right-sided facial pain. On examination no dental malocclusion and extraocular movements are full and intact.          Janay Bocanegra MD  04/11/23 [id number]
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
## 2024-03-05 ENCOUNTER — HOSPITAL ENCOUNTER (EMERGENCY)
Age: 49
Discharge: HOME | End: 2024-03-05
Attending: EMERGENCY MEDICINE
Payer: MEDICAID

## 2024-03-05 ENCOUNTER — APPOINTMENT (OUTPATIENT)
Dept: GENERAL RADIOLOGY | Age: 49
End: 2024-03-05
Payer: MEDICAID

## 2024-03-05 ENCOUNTER — APPOINTMENT (OUTPATIENT)
Dept: CT IMAGING | Age: 49
End: 2024-03-05
Payer: MEDICAID

## 2024-03-05 VITALS
HEART RATE: 102 BPM | HEIGHT: 68 IN | BODY MASS INDEX: 31.17 KG/M2 | OXYGEN SATURATION: 100 % | SYSTOLIC BLOOD PRESSURE: 128 MMHG | TEMPERATURE: 98 F | WEIGHT: 205.7 LBS | RESPIRATION RATE: 16 BRPM | DIASTOLIC BLOOD PRESSURE: 81 MMHG

## 2024-03-05 DIAGNOSIS — H53.8 BLURRY VISION: Primary | ICD-10-CM

## 2024-03-05 LAB
ALBUMIN SERPL-MCNC: 4 G/DL (ref 3.4–5)
ALBUMIN/GLOB SERPL: 1.2 {RATIO} (ref 1.1–2.2)
ALP SERPL-CCNC: 152 U/L (ref 40–129)
ALT SERPL-CCNC: 63 U/L (ref 10–40)
ANION GAP SERPL CALCULATED.3IONS-SCNC: 12 MMOL/L (ref 3–16)
AST SERPL-CCNC: 82 U/L (ref 15–37)
BASOPHILS # BLD: 0.1 K/UL (ref 0–0.2)
BASOPHILS NFR BLD: 0.8 %
BILIRUB SERPL-MCNC: <0.2 MG/DL (ref 0–1)
BUN SERPL-MCNC: 15 MG/DL (ref 7–20)
CALCIUM SERPL-MCNC: 9.8 MG/DL (ref 8.3–10.6)
CHLORIDE SERPL-SCNC: 101 MMOL/L (ref 99–110)
CO2 SERPL-SCNC: 26 MMOL/L (ref 21–32)
CREAT SERPL-MCNC: 0.8 MG/DL (ref 0.6–1.1)
DEPRECATED RDW RBC AUTO: 15.1 % (ref 12.4–15.4)
EOSINOPHIL # BLD: 0 K/UL (ref 0–0.6)
EOSINOPHIL NFR BLD: 0.3 %
GFR SERPLBLD CREATININE-BSD FMLA CKD-EPI: >60 ML/MIN/{1.73_M2}
GLUCOSE SERPL-MCNC: 222 MG/DL (ref 70–99)
HCG SERPL QL: NEGATIVE
HCT VFR BLD AUTO: 39.2 % (ref 36–48)
HGB BLD-MCNC: 12.8 G/DL (ref 12–16)
LYMPHOCYTES # BLD: 2.8 K/UL (ref 1–5.1)
LYMPHOCYTES NFR BLD: 21.8 %
MCH RBC QN AUTO: 25.6 PG (ref 26–34)
MCHC RBC AUTO-ENTMCNC: 32.7 G/DL (ref 31–36)
MCV RBC AUTO: 78.2 FL (ref 80–100)
MONOCYTES # BLD: 0.7 K/UL (ref 0–1.3)
MONOCYTES NFR BLD: 5.6 %
NEUTROPHILS # BLD: 9.2 K/UL (ref 1.7–7.7)
NEUTROPHILS NFR BLD: 71.5 %
PLATELET # BLD AUTO: 314 K/UL (ref 135–450)
PMV BLD AUTO: 9.4 FL (ref 5–10.5)
POTASSIUM SERPL-SCNC: 4 MMOL/L (ref 3.5–5.1)
PROT SERPL-MCNC: 7.3 G/DL (ref 6.4–8.2)
RBC # BLD AUTO: 5.01 M/UL (ref 4–5.2)
SODIUM SERPL-SCNC: 139 MMOL/L (ref 136–145)
TROPONIN, HIGH SENSITIVITY: 7 NG/L (ref 0–14)
WBC # BLD AUTO: 12.8 K/UL (ref 4–11)

## 2024-03-05 PROCEDURE — 80053 COMPREHEN METABOLIC PANEL: CPT

## 2024-03-05 PROCEDURE — 85025 COMPLETE CBC W/AUTO DIFF WBC: CPT

## 2024-03-05 PROCEDURE — 2580000003 HC RX 258: Performed by: EMERGENCY MEDICINE

## 2024-03-05 PROCEDURE — 71045 X-RAY EXAM CHEST 1 VIEW: CPT

## 2024-03-05 PROCEDURE — 70450 CT HEAD/BRAIN W/O DYE: CPT

## 2024-03-05 PROCEDURE — 93005 ELECTROCARDIOGRAM TRACING: CPT | Performed by: EMERGENCY MEDICINE

## 2024-03-05 PROCEDURE — 84484 ASSAY OF TROPONIN QUANT: CPT

## 2024-03-05 PROCEDURE — 84703 CHORIONIC GONADOTROPIN ASSAY: CPT

## 2024-03-05 PROCEDURE — 99285 EMERGENCY DEPT VISIT HI MDM: CPT

## 2024-03-05 RX ORDER — SODIUM CHLORIDE 9 MG/ML
INJECTION, SOLUTION INTRAVENOUS CONTINUOUS
Status: DISCONTINUED | OUTPATIENT
Start: 2024-03-05 | End: 2024-03-05 | Stop reason: HOSPADM

## 2024-03-05 RX ADMIN — SODIUM CHLORIDE: 9 INJECTION, SOLUTION INTRAVENOUS at 19:19

## 2024-03-05 ASSESSMENT — VISUAL ACUITY
OU: 20/20
OS: 20/25
OD: 20/20

## 2024-03-06 LAB
EKG ATRIAL RATE: 104 BPM
EKG DIAGNOSIS: NORMAL
EKG P AXIS: 69 DEGREES
EKG P-R INTERVAL: 146 MS
EKG Q-T INTERVAL: 348 MS
EKG QRS DURATION: 82 MS
EKG QTC CALCULATION (BAZETT): 457 MS
EKG R AXIS: 17 DEGREES
EKG T AXIS: 38 DEGREES
EKG VENTRICULAR RATE: 104 BPM

## 2024-03-06 PROCEDURE — 93010 ELECTROCARDIOGRAM REPORT: CPT | Performed by: INTERNAL MEDICINE

## 2024-03-06 NOTE — ED PROVIDER NOTES
[facility] EMERGENCY DEPARTMENT  eMERGENCY dEPARTMENT eNCOUnter      Pt Name: Katrina Galaviz  MRN: [id number]  Birthdate 1975  Date of evaluation: 3/5/2024  Provider: Obdulio Andrew MD  PCP: No primary care provider on file.      CHIEF COMPLAINT       Blurry vision    HISTORY OFPRESENT ILLNESS   (Location/Symptom, Timing/Onset, Context/Setting, Quality, Duration, Modifying Factors,Severity)  Note limiting factors.     Katrina Galaviz is a 48 y.o. female presents with several days of blurry vision says that it is fairly constant she says that she has had it before when her blood sugars been high she was recently taken off of her insulin and is only on metformin and is worried that her blood sugar is running high she denies any eye pain denies any headache denies any fever or chills denies any eye injury.    Nursing Notes were all reviewed and agreed with or any disagreements were addressed  in the HPI.    REVIEW OF SYSTEMS    (2-9 systems for level 4, 10 or more for level 5)     Review of Systems    Positives and Pertinent negatives as per HPI.  Except as noted above in the ROS, all other systems were reviewed andnegative.       PASTMEDICAL HISTORY     Past Medical History:   Diagnosis Date    Diabetes mellitus (HCC)     Hypertension          SURGICAL HISTORY       Past Surgical History:   Procedure Laterality Date    APPENDECTOMY           CURRENT MEDICATIONS       Previous Medications    ALCOHOL SWABS (ALCOHOL PADS) 70 % PADS    1 box by Does not apply route 4 times daily    AMLODIPINE (NORVASC) 5 MG TABLET    Take 1 tablet by mouth daily    BLOOD GLUCOSE MONITOR STRIPS    Test 3 times a day & as needed for symptoms of irregular blood glucose. Dispense sufficient amount for indicated testing frequency plus additional to accommodate PRN testing needs.    BLOOD GLUCOSE MONITOR STRIPS    Test 2 times a day & as needed for symptoms of irregular blood glucose. Dispense sufficient amount for

## 2024-03-12 ENCOUNTER — OFFICE VISIT (OUTPATIENT)
Dept: INTERNAL MEDICINE CLINIC | Age: 49
End: 2024-03-12
Payer: MEDICAID

## 2024-03-12 VITALS
SYSTOLIC BLOOD PRESSURE: 130 MMHG | DIASTOLIC BLOOD PRESSURE: 68 MMHG | HEART RATE: 106 BPM | OXYGEN SATURATION: 98 % | HEIGHT: 68 IN | WEIGHT: 203.4 LBS | BODY MASS INDEX: 30.83 KG/M2

## 2024-03-12 DIAGNOSIS — Z12.31 ENCOUNTER FOR SCREENING MAMMOGRAM FOR MALIGNANT NEOPLASM OF BREAST: ICD-10-CM

## 2024-03-12 DIAGNOSIS — F48.9 MENTAL HEALTH PROBLEM: ICD-10-CM

## 2024-03-12 DIAGNOSIS — Z79.4 TYPE 2 DIABETES MELLITUS WITH HYPERGLYCEMIA, WITH LONG-TERM CURRENT USE OF INSULIN (HCC): Primary | ICD-10-CM

## 2024-03-12 DIAGNOSIS — E11.65 TYPE 2 DIABETES MELLITUS WITH HYPERGLYCEMIA, WITH LONG-TERM CURRENT USE OF INSULIN (HCC): Primary | ICD-10-CM

## 2024-03-12 PROCEDURE — 99204 OFFICE O/P NEW MOD 45 MIN: CPT | Performed by: NURSE PRACTITIONER

## 2024-03-12 RX ORDER — ARIPIPRAZOLE 10 MG/1
10 TABLET ORAL DAILY
COMMUNITY

## 2024-03-12 RX ORDER — QUETIAPINE FUMARATE 25 MG/1
25 TABLET, FILM COATED ORAL DAILY PRN
COMMUNITY

## 2024-03-12 ASSESSMENT — PATIENT HEALTH QUESTIONNAIRE - PHQ9
SUM OF ALL RESPONSES TO PHQ QUESTIONS 1-9: 3
8. MOVING OR SPEAKING SO SLOWLY THAT OTHER PEOPLE COULD HAVE NOTICED. OR THE OPPOSITE, BEING SO FIGETY OR RESTLESS THAT YOU HAVE BEEN MOVING AROUND A LOT MORE THAN USUAL: 0
1. LITTLE INTEREST OR PLEASURE IN DOING THINGS: 0
9. THOUGHTS THAT YOU WOULD BE BETTER OFF DEAD, OR OF HURTING YOURSELF: 0
SUM OF ALL RESPONSES TO PHQ QUESTIONS 1-9: 3
3. TROUBLE FALLING OR STAYING ASLEEP: 0
SUM OF ALL RESPONSES TO PHQ9 QUESTIONS 1 & 2: 0
7. TROUBLE CONCENTRATING ON THINGS, SUCH AS READING THE NEWSPAPER OR WATCHING TELEVISION: 1
4. FEELING TIRED OR HAVING LITTLE ENERGY: 1
5. POOR APPETITE OR OVEREATING: 0
2. FEELING DOWN, DEPRESSED OR HOPELESS: 0
6. FEELING BAD ABOUT YOURSELF - OR THAT YOU ARE A FAILURE OR HAVE LET YOURSELF OR YOUR FAMILY DOWN: 1

## 2024-03-12 ASSESSMENT — ANXIETY QUESTIONNAIRES
2. NOT BEING ABLE TO STOP OR CONTROL WORRYING: 0
GAD7 TOTAL SCORE: 2
5. BEING SO RESTLESS THAT IT IS HARD TO SIT STILL: 0
6. BECOMING EASILY ANNOYED OR IRRITABLE: 0
3. WORRYING TOO MUCH ABOUT DIFFERENT THINGS: 1
4. TROUBLE RELAXING: 1
7. FEELING AFRAID AS IF SOMETHING AWFUL MIGHT HAPPEN: 0
1. FEELING NERVOUS, ANXIOUS, OR ON EDGE: 0

## 2024-03-12 ASSESSMENT — ENCOUNTER SYMPTOMS
RESPIRATORY NEGATIVE: 1
GASTROINTESTINAL NEGATIVE: 1

## 2024-03-12 NOTE — PROGRESS NOTES
diaphoretic.   Eyes:      Conjunctiva/sclera: Conjunctivae normal.   Cardiovascular:      Rate and Rhythm: Regular rhythm. Tachycardia present.      Pulses: Normal pulses.      Heart sounds: Normal heart sounds. No murmur heard.     No friction rub. No gallop.   Pulmonary:      Effort: No respiratory distress.      Breath sounds: Normal breath sounds. No stridor. No wheezing, rhonchi or rales.   Abdominal:      Palpations: Abdomen is soft.   Musculoskeletal:         General: Normal range of motion.      Cervical back: Normal range of motion and neck supple. No rigidity.   Skin:     General: Skin is warm and dry.   Neurological:      Mental Status: She is alert and oriented to person, place, and time.   Psychiatric:         Mood and Affect: Mood normal.         Behavior: Behavior normal.         Thought Content: Thought content normal.         Judgment: Judgment normal.            Assessment/Plan:  1. Type 2 diabetes mellitus with hyperglycemia, with long-term current use of insulin (HCC)  - Continue metformin  - Hemoglobin A1C  - LIPID PANEL  - Low-carb diet and regular exercise    2. Mental health problem  - Continue Abilify and Seroquel   - External Referral to Psychiatry    3. Encounter for screening mammogram for malignant neoplasm of breast  - MARII DIGITAL SCREEN W OR WO CAD BILATERAL; Future        Return in about 3 months (around 6/12/2024), or if symptoms worsen or fail to improve.    This dictation was generated by voice recognition computer software.  Although all attempts are made to edit the dictation for accuracy, there may be errors in the transcription that are not intended.

## 2024-03-12 NOTE — PATIENT INSTRUCTIONS
I will send a message or call if your lab work is abnormal.   Schedule appointment with psychiatrist.

## 2024-03-13 LAB
CHOLEST SERPL-MCNC: 157 MG/DL (ref 0–199)
EST. AVERAGE GLUCOSE BLD GHB EST-MCNC: 248.9 MG/DL
HBA1C MFR BLD: 10.3 %
HDLC SERPL-MCNC: 49 MG/DL (ref 40–60)
LDLC SERPL CALC-MCNC: 81 MG/DL
TRIGL SERPL-MCNC: 136 MG/DL (ref 0–150)
VLDLC SERPL CALC-MCNC: 27 MG/DL

## 2024-03-14 ENCOUNTER — TELEPHONE (OUTPATIENT)
Dept: INTERNAL MEDICINE CLINIC | Age: 49
End: 2024-03-14

## 2024-03-14 DIAGNOSIS — E11.9 NEW ONSET TYPE 2 DIABETES MELLITUS (HCC): ICD-10-CM

## 2024-03-14 RX ORDER — INSULIN GLARGINE 100 [IU]/ML
20 INJECTION, SOLUTION SUBCUTANEOUS NIGHTLY
Qty: 15 ML | Refills: 1 | Status: SHIPPED | OUTPATIENT
Start: 2024-03-14

## 2024-03-14 NOTE — TELEPHONE ENCOUNTER
Patient says that she has the results completed her labs and would like for you to call her and go over them with her.  She says that she needs some explanations to some of the results especially the A1C she wants to know if she needs to take insulin.  Please advise

## 2024-03-15 NOTE — TELEPHONE ENCOUNTER
Please let pt know she can send a message through [employer] or schedule a f/u appointment for any additional questions.

## 2024-03-15 NOTE — TELEPHONE ENCOUNTER
Catalina rec'd consult from [place] [id number] regarding pt's cancellation of transportation on 8/18/21. Catalina called mom to inform her that writer will contact Hany Carson from [place] to have her cancel transportation for pt on 8/18/21. Catalina let mom know that writer will call her after speaking with Jacy  from [place]. Mom verbalized understanding. PT IS INFORMED

## 2024-03-18 ENCOUNTER — OFFICE VISIT (OUTPATIENT)
Age: 49
End: 2024-03-18
Payer: MEDICAID

## 2024-03-18 VITALS
HEART RATE: 96 BPM | BODY MASS INDEX: 30.87 KG/M2 | DIASTOLIC BLOOD PRESSURE: 74 MMHG | SYSTOLIC BLOOD PRESSURE: 128 MMHG | OXYGEN SATURATION: 99 % | WEIGHT: 203 LBS

## 2024-03-18 DIAGNOSIS — Z01.419 WELL WOMAN EXAM WITH ROUTINE GYNECOLOGICAL EXAM: Primary | ICD-10-CM

## 2024-03-18 PROCEDURE — 99386 PREV VISIT NEW AGE 40-64: CPT | Performed by: OBSTETRICS & GYNECOLOGY

## 2024-03-18 NOTE — PROGRESS NOTES
SUBJECTIVE:  Katrina Galaviz is an 48 y.o. year old woman who presents for annual gyn exam.    She is working with [facility] to improve her glycemic control and coordinate her mental health services.    She has been instructed to get her mammogram done.    REVIEW OF SYSTEMS:  No complaints of symptoms involving:  Constitutional: there has been no unanticipated weight loss. There's been no change in activity level. Negative for fever, chills.  Eyes: No visual changes, double vision, or scotomata. No scleral icterus.  HENT: No Headaches, hearing loss or vertigo. No sore throat, ear pain or nasal congestion  Respiratory: no cough or wheezing, no sputum production, no hemoptysis.    Gastrointestinal: No abdominal pain, appetite loss, blood in stools. No change in bowel habits.  Genitourinary: No dysuria, trouble voiding, or hematuria. No change in bladder habits.  Musculoskeletal:  No gait disturbance,no weakness or joint complaints.  Skin: No rash or pruritis.  Neurological: No headache, vision changes, change in muscle strength, numbness or tingling. No change in gait, balance, coordination.  Psychiatric: No anxiety, or depression. No change in mood or behavior.  Endocrine: No temperature intolerance. No excessive thirst, fluid intake, or urination. No tremor.  Hematologic/Lymphatic: No abnormal bruising or bleeding, blood clots or swollen lymph nodes.       Patient Active Problem List   Diagnosis    Class 3 obesity with body mass index (BMI) of 40.0 to 44.9 in adult    Type 2 diabetes mellitus with hyperglycemia, with long-term current use of insulin (HCC)    Vaginal yeast infection     Current Outpatient Medications   Medication Sig Dispense Refill    insulin glargine (LANTUS SOLOSTAR) 100 UNIT/ML injection pen Inject 20 Units into the skin nightly 15 mL 1    Insulin Pen Needle 31G X 8 MM MISC 1 each by Does not apply route nightly 100 each 3    ARIPiprazole (ABILIFY) 10 MG tablet Take 1 tablet by mouth daily

## 2024-03-20 LAB
HPV HR 12 DNA SPEC QL NAA+PROBE: NOT DETECTED
HPV16 DNA SPEC QL NAA+PROBE: NOT DETECTED
HPV16+18+H RISK 12 DNA SPEC-IMP: NORMAL
HPV18 DNA SPEC QL NAA+PROBE: NOT DETECTED

## 2024-05-22 ENCOUNTER — CLINICAL DOCUMENTATION (OUTPATIENT)
Dept: PSYCHIATRY | Age: 49
End: 2024-05-22

## 2024-05-22 NOTE — PROGRESS NOTES
Patient had NCNS for new patient appointment on 5/22/2024  with integrated behavioral health services. Per POC for new patient no call no show for initial psych evaluation- I will dismiss this patient and block from making appointment with me in the future.

## 2024-12-03 ENCOUNTER — TELEPHONE (OUTPATIENT)
Dept: INTERNAL MEDICINE CLINIC | Age: 49
End: 2024-12-03

## 2024-12-03 DIAGNOSIS — E11.9 NEW ONSET TYPE 2 DIABETES MELLITUS (HCC): ICD-10-CM

## 2024-12-03 DIAGNOSIS — E11.65 TYPE 2 DIABETES MELLITUS WITH HYPERGLYCEMIA, WITH LONG-TERM CURRENT USE OF INSULIN (HCC): ICD-10-CM

## 2024-12-03 DIAGNOSIS — Z79.4 TYPE 2 DIABETES MELLITUS WITH HYPERGLYCEMIA, WITH LONG-TERM CURRENT USE OF INSULIN (HCC): ICD-10-CM

## 2024-12-03 RX ORDER — INSULIN GLARGINE 100 [IU]/ML
20 INJECTION, SOLUTION SUBCUTANEOUS NIGHTLY
Qty: 15 ML | Refills: 0 | Status: SHIPPED | OUTPATIENT
Start: 2024-12-03

## 2024-12-03 RX ORDER — BLOOD SUGAR DIAGNOSTIC
1 STRIP MISCELLANEOUS 3 TIMES DAILY
Qty: 100 EACH | Refills: 3 | Status: SHIPPED | OUTPATIENT
Start: 2024-12-03

## 2024-12-03 NOTE — TELEPHONE ENCOUNTER
Patient is requesting refill on Lantus, metformin, glucometer test strips and lancets. Please advise.

## 2024-12-03 NOTE — TELEPHONE ENCOUNTER
Called pt   No answer   Left message to give office a call in regards to provider stating pt needs appointment

## 2024-12-03 NOTE — TELEPHONE ENCOUNTER
Medication:   Requested Prescriptions     Pending Prescriptions Disp Refills    blood glucose test strips (ONETOUCH VERIO) strip 100 each 3     Si each by In Vitro route 3 times daily As needed.    insulin glargine (LANTUS SOLOSTAR) 100 UNIT/ML injection pen 15 mL 1     Sig: Inject 20 Units into the skin nightly    metFORMIN (GLUCOPHAGE) 1000 MG tablet 90 tablet 2     Sig: Take 1 tablet by mouth 2 times daily (with meals)        Last Filled:      Patient Phone Number: [phone] (home)     Last appt: 3/12/2024   Next appt: Visit date not found    Last OARRS:       2023     4:25 PM   RX Monitoring   Acute Pain Prescriptions Severe pain not adequately treated with lower dose.

## 2025-02-20 DIAGNOSIS — E11.9 NEW ONSET TYPE 2 DIABETES MELLITUS (HCC): ICD-10-CM

## 2025-02-20 NOTE — TELEPHONE ENCOUNTER
Medication:   Requested Prescriptions     Pending Prescriptions Disp Refills    metFORMIN (GLUCOPHAGE) 1000 MG tablet [Pharmacy Med Name: METFORMIN 1000MG TABLETS] 60 tablet 0     Sig: TAKE 1 TABLET BY MOUTH TWICE DAILY WITH MEALS        Last Filled:      Patient Phone Number: [phone] (home)     Last appt: 3/12/2024   Next appt: Visit date not found    Last OARRS:       6/13/2023     4:25 PM   RX Monitoring   Acute Pain Prescriptions Severe pain not adequately treated with lower dose.